# Patient Record
Sex: MALE | Race: OTHER | Employment: FULL TIME | URBAN - METROPOLITAN AREA
[De-identification: names, ages, dates, MRNs, and addresses within clinical notes are randomized per-mention and may not be internally consistent; named-entity substitution may affect disease eponyms.]

---

## 2017-11-01 ENCOUNTER — APPOINTMENT (OUTPATIENT)
Dept: CT IMAGING | Age: 52
End: 2017-11-01
Payer: COMMERCIAL

## 2017-11-01 ENCOUNTER — HOSPITAL ENCOUNTER (EMERGENCY)
Age: 52
Discharge: HOME | End: 2017-11-01
Payer: COMMERCIAL

## 2017-11-01 VITALS
OXYGEN SATURATION: 99 % | DIASTOLIC BLOOD PRESSURE: 83 MMHG | TEMPERATURE: 98.5 F | HEIGHT: 71 IN | WEIGHT: 205 LBS | HEART RATE: 58 BPM | SYSTOLIC BLOOD PRESSURE: 117 MMHG | BODY MASS INDEX: 28.7 KG/M2 | RESPIRATION RATE: 16 BRPM

## 2017-11-01 DIAGNOSIS — R10.31 RIGHT LOWER QUADRANT ABDOMINAL PAIN: Primary | ICD-10-CM

## 2017-11-01 LAB
ALBUMIN SERPL-MCNC: 4.3 G/DL (ref 3.9–4.9)
ALP BLD-CCNC: 77 U/L (ref 35–104)
ALT SERPL-CCNC: 16 U/L (ref 0–41)
ANION GAP SERPL CALCULATED.3IONS-SCNC: 11 MEQ/L (ref 7–13)
AST SERPL-CCNC: 17 U/L (ref 0–40)
BASOPHILS ABSOLUTE: 0 K/UL (ref 0–0.2)
BASOPHILS RELATIVE PERCENT: 0.4 %
BILIRUB SERPL-MCNC: 0.5 MG/DL (ref 0–1.2)
BILIRUBIN URINE: NEGATIVE
BLOOD, URINE: NEGATIVE
BUN BLDV-MCNC: 16 MG/DL (ref 6–20)
CALCIUM SERPL-MCNC: 8.8 MG/DL (ref 8.6–10.2)
CHLORIDE BLD-SCNC: 102 MEQ/L (ref 98–107)
CLARITY: CLEAR
CO2: 25 MEQ/L (ref 22–29)
COLOR: YELLOW
CREAT SERPL-MCNC: 0.71 MG/DL (ref 0.7–1.2)
EOSINOPHILS ABSOLUTE: 0.1 K/UL (ref 0–0.7)
EOSINOPHILS RELATIVE PERCENT: 2.4 %
GFR AFRICAN AMERICAN: >60
GFR NON-AFRICAN AMERICAN: >60
GLOBULIN: 2.3 G/DL (ref 2.3–3.5)
GLUCOSE BLD-MCNC: 95 MG/DL (ref 74–109)
GLUCOSE URINE: NEGATIVE MG/DL
HCT VFR BLD CALC: 40.5 % (ref 42–52)
HEMOGLOBIN: 13.6 G/DL (ref 14–18)
KETONES, URINE: NEGATIVE MG/DL
LEUKOCYTE ESTERASE, URINE: NEGATIVE
LIPASE: 35 U/L (ref 13–60)
LYMPHOCYTES ABSOLUTE: 1.9 K/UL (ref 1–4.8)
LYMPHOCYTES RELATIVE PERCENT: 33.3 %
MCH RBC QN AUTO: 31.3 PG (ref 27–31.3)
MCHC RBC AUTO-ENTMCNC: 33.5 % (ref 33–37)
MCV RBC AUTO: 93.4 FL (ref 80–100)
MONOCYTES ABSOLUTE: 0.4 K/UL (ref 0.2–0.8)
MONOCYTES RELATIVE PERCENT: 7.6 %
NEUTROPHILS ABSOLUTE: 3.3 K/UL (ref 1.4–6.5)
NEUTROPHILS RELATIVE PERCENT: 56.3 %
NITRITE, URINE: NEGATIVE
PDW BLD-RTO: 12.8 % (ref 11.5–14.5)
PH UA: 6 (ref 5–9)
PLATELET # BLD: 184 K/UL (ref 130–400)
POTASSIUM SERPL-SCNC: 4 MEQ/L (ref 3.5–5.1)
PROTEIN UA: NEGATIVE MG/DL
RBC # BLD: 4.34 M/UL (ref 4.7–6.1)
SODIUM BLD-SCNC: 138 MEQ/L (ref 132–144)
SPECIFIC GRAVITY UA: 1.02 (ref 1–1.03)
TOTAL PROTEIN: 6.6 G/DL (ref 6.4–8.1)
URINE REFLEX TO CULTURE: NORMAL
UROBILINOGEN, URINE: 0.2 E.U./DL
WBC # BLD: 5.8 K/UL (ref 4.8–10.8)

## 2017-11-01 PROCEDURE — 6360000004 HC RX CONTRAST MEDICATION: Performed by: PHYSICIAN ASSISTANT

## 2017-11-01 PROCEDURE — 99284 EMERGENCY DEPT VISIT MOD MDM: CPT

## 2017-11-01 PROCEDURE — 74177 CT ABD & PELVIS W/CONTRAST: CPT

## 2017-11-01 PROCEDURE — 81003 URINALYSIS AUTO W/O SCOPE: CPT

## 2017-11-01 PROCEDURE — 80053 COMPREHEN METABOLIC PANEL: CPT

## 2017-11-01 PROCEDURE — 83690 ASSAY OF LIPASE: CPT

## 2017-11-01 PROCEDURE — 36415 COLL VENOUS BLD VENIPUNCTURE: CPT

## 2017-11-01 PROCEDURE — 85025 COMPLETE CBC W/AUTO DIFF WBC: CPT

## 2017-11-01 PROCEDURE — 2500000003 HC RX 250 WO HCPCS

## 2017-11-01 RX ORDER — SODIUM CHLORIDE 0.9 % (FLUSH) 0.9 %
3 SYRINGE (ML) INJECTION EVERY 8 HOURS
Status: DISCONTINUED | OUTPATIENT
Start: 2017-11-01 | End: 2017-11-01 | Stop reason: HOSPADM

## 2017-11-01 RX ORDER — ESOMEPRAZOLE MAGNESIUM 40 MG/1
40 CAPSULE, DELAYED RELEASE ORAL
COMMUNITY
End: 2018-07-24

## 2017-11-01 RX ORDER — DICYCLOMINE HYDROCHLORIDE 10 MG/1
10 CAPSULE ORAL EVERY 6 HOURS PRN
Qty: 20 CAPSULE | Refills: 0 | Status: SHIPPED | OUTPATIENT
Start: 2017-11-01 | End: 2018-07-24

## 2017-11-01 RX ADMIN — BARIUM SULFATE 450 ML: 21 SUSPENSION ORAL at 09:05

## 2017-11-01 RX ADMIN — IOPAMIDOL 100 ML: 755 INJECTION, SOLUTION INTRAVENOUS at 10:25

## 2017-11-01 ASSESSMENT — ENCOUNTER SYMPTOMS
TROUBLE SWALLOWING: 0
NAUSEA: 1
BACK PAIN: 0
SORE THROAT: 0
PHOTOPHOBIA: 0
ABDOMINAL PAIN: 1
SHORTNESS OF BREATH: 0
DIARRHEA: 1
COUGH: 0

## 2017-11-01 ASSESSMENT — PAIN DESCRIPTION - PAIN TYPE: TYPE: ACUTE PAIN

## 2017-11-01 ASSESSMENT — PAIN DESCRIPTION - DESCRIPTORS: DESCRIPTORS: SHARP

## 2017-11-01 ASSESSMENT — PAIN DESCRIPTION - ORIENTATION: ORIENTATION: LOWER

## 2017-11-01 ASSESSMENT — PAIN SCALES - GENERAL: PAINLEVEL_OUTOF10: 6

## 2017-11-01 ASSESSMENT — PAIN DESCRIPTION - ONSET: ONSET: ON-GOING

## 2017-11-01 ASSESSMENT — PAIN DESCRIPTION - FREQUENCY: FREQUENCY: INTERMITTENT

## 2017-11-01 ASSESSMENT — PAIN DESCRIPTION - LOCATION: LOCATION: ABDOMEN

## 2017-11-01 ASSESSMENT — PAIN DESCRIPTION - PROGRESSION: CLINICAL_PROGRESSION: GRADUALLY WORSENING

## 2017-11-01 NOTE — ED PROVIDER NOTES
[address] [facility] ED  eMERGENCY dEPARTMENT eNCOUnter      Pt Name: Ada Lopez  MRN: [id number]  [place] 1965  Date of evaluation: 11/1/2017  Provider: Myles Fernandez PA-C      HISTORY OF PRESENT ILLNESS    HPI  Ada Lopez is a 47 y.o. male, who presents for evaluation of Lower abdominal pain that has been present since October 8. He states he followed with his primary care doctor, on and was told that it could be a \"stomach virus\". He did report nausea and vomiting at onset, but states he hasn't had any in about a week. He also reports ice cream consistency stools since onset of pain. Denies any fever, chills, recent antibiotic usage, recent travel, sick contacts. He has not had any imaging performed for this abdominal pain. He reports the past 2 nights that the pain is worsening in the right lower and left lower abdomen in that he feels a \"lump\" which is painful to touch. He has a remote history of cholecystectomy, otherwise no abdominal surgeries. REVIEW OF SYSTEMS       Review of Systems   Constitutional: Negative for chills and fever. HENT: Negative for ear pain, sore throat and trouble swallowing. Eyes: Negative for photophobia and visual disturbance. Respiratory: Negative for cough and shortness of breath. Cardiovascular: Negative for chest pain, palpitations and leg swelling. Gastrointestinal: Positive for abdominal pain, diarrhea and nausea. Genitourinary: Negative for difficulty urinating, dysuria, flank pain and hematuria. Musculoskeletal: Negative for back pain. Neurological: Negative for syncope, speech difficulty, numbness and headaches. Psychiatric/Behavioral: Negative for agitation, confusion and hallucinations.          PAST MEDICAL HISTORY     Past Medical History:   Diagnosis Date    GERD (gastroesophageal reflux disease)          SURGICAL HISTORY       Past Surgical History:   Procedure Laterality Date    ARM SURGERY Left     CHOLECYSTECTOMY      TONSILLECTOMY           CURRENT MEDICATIONS       Previous Medications    ESOMEPRAZOLE (NEXIUM) 40 MG DELAYED RELEASE CAPSULE    Take 40 mg by mouth every morning (before breakfast)       ALLERGIES     Review of patient's allergies indicates no known allergies. FAMILY HISTORY     History reviewed. No pertinent family history. SOCIAL HISTORY       Social History     Social History    Marital status:      Spouse name: N/A    Number of children: N/A    Years of education: N/A     Social History Main Topics    Smoking status: Never Smoker    Smokeless tobacco: Never Used    Alcohol use Yes      Comment: occasionally    Drug use: No    Sexual activity: Not Asked     Other Topics Concern    None     Social History Narrative    None         PHYSICAL EXAM       ED Triage Vitals [11/01/17 0821]   BP Temp Temp Source Pulse Resp SpO2 Height Weight   135/81 98.5 °F (36.9 °C) Oral 61 16 99 % 5' 11\" (1.803 m) 205 lb (93 kg)       Physical Exam   Constitutional: He is oriented to person, place, and time. No distress. HENT:   Head: Normocephalic and atraumatic. Eyes: EOM are normal. Pupils are equal, round, and reactive to light. Neck: Neck supple. Cardiovascular: Normal rate and regular rhythm. Pulmonary/Chest: Breath sounds normal. No respiratory distress. Abdominal: Soft. He exhibits distension. Bowel sounds are decreased. There is tenderness in the right lower quadrant, suprapubic area and left lower quadrant. There is no rebound and no CVA tenderness. Musculoskeletal: Normal range of motion. He exhibits no edema. Neurological: He is alert and oriented to person, place, and time. Skin: Skin is warm and dry. No rash noted. Nursing note and vitals reviewed.         LABS:  Labs Reviewed   CBC WITH AUTO DIFFERENTIAL - Abnormal; Notable for the following:        Result Value    RBC 4.34 (*)     Hemoglobin 13.6 (*)     Hematocrit 40.5 (*)     All other components within normal limits   COMPREHENSIVE METABOLIC PANEL   LIPASE   URINE RT REFLEX TO CULTURE         Procedures      MDM:   Vitals:    Vitals:    11/01/17 0821 11/01/17 1040   BP: 135/81 117/83   Pulse: 61 58   Resp: 16 16   Temp: 98.5 °F (36.9 °C)    TempSrc: Oral    SpO2: 99% 99%   Weight: 205 lb (93 kg)    Height: 5' 11\" (1.803 m)        CT scan with IV and oral contrast of the abdomen and pelvis is read by the radiologist to show no acute abnormalities. Laboratory evaluation is grossly unremarkable. I discussed the results of the patient. I explained to him that the cause of his abdominal pain remains uncertain. I offered a prescription for Bentyl and recommended follow-up to his primary care provider GI specialist.  Patient is agreeable and receptive to plan. Standard anticipatory guidance given to patient upon discharge. I have given them a specific time frame in which to follow-up and who to follow-up with. I have also advised them that they should return to the emergency department if they get worse, or not getting better or develop any new or concerning symptoms. Patient demonstrates understanding. FINAL IMPRESSION      1.  Right lower quadrant abdominal pain          DISPOSITION/PLAN   DISPOSITION Decision to Discharge    PATIENT REFERRED TO:  Wilfrido Saavedra DO  [address] [facility] [address] [phone]    In 2 days        DISCHARGE MEDICATIONS:  New Prescriptions    DICYCLOMINE (BENTYL) 10 MG CAPSULE    Take 1 capsule by mouth every 6 hours as needed (cramps)            Roberto Carlos Galindo PA-C (electronically signed)  Emergency Physician Assistant            Gala Newton PA-C  11/01/17 2480

## 2018-06-26 ENCOUNTER — HOSPITAL ENCOUNTER (OUTPATIENT)
Dept: GENERAL RADIOLOGY | Age: 53
Discharge: HOME | End: 2018-06-28
Payer: COMMERCIAL

## 2018-06-26 ENCOUNTER — HOSPITAL ENCOUNTER (OUTPATIENT)
Age: 53
Discharge: HOME | End: 2018-06-28
Payer: COMMERCIAL

## 2018-06-26 DIAGNOSIS — S93.401A SPRAIN OF LIGAMENT OF RIGHT ANKLE, INITIAL ENCOUNTER: ICD-10-CM

## 2018-06-26 PROCEDURE — 73630 X-RAY EXAM OF FOOT: CPT

## 2018-06-26 PROCEDURE — 73610 X-RAY EXAM OF ANKLE: CPT

## 2018-07-24 ENCOUNTER — OFFICE VISIT (OUTPATIENT)
Dept: UROLOGY | Age: 53
End: 2018-07-24
Payer: COMMERCIAL

## 2018-07-24 VITALS
HEIGHT: 71 IN | WEIGHT: 214 LBS | HEART RATE: 82 BPM | SYSTOLIC BLOOD PRESSURE: 100 MMHG | DIASTOLIC BLOOD PRESSURE: 72 MMHG | BODY MASS INDEX: 29.96 KG/M2

## 2018-07-24 DIAGNOSIS — N50.82 SCROTAL PAIN: Primary | ICD-10-CM

## 2018-07-24 PROCEDURE — G8419 CALC BMI OUT NRM PARAM NOF/U: HCPCS | Performed by: UROLOGY

## 2018-07-24 PROCEDURE — G8427 DOCREV CUR MEDS BY ELIG CLIN: HCPCS | Performed by: UROLOGY

## 2018-07-24 PROCEDURE — 99243 OFF/OP CNSLTJ NEW/EST LOW 30: CPT | Performed by: UROLOGY

## 2018-07-24 PROCEDURE — 3017F COLORECTAL CA SCREEN DOC REV: CPT | Performed by: UROLOGY

## 2018-07-24 NOTE — PROGRESS NOTES
 None     History reviewed. No pertinent family history. Current Outpatient Prescriptions   Medication Sig Dispense Refill    Famotidine (PEPCID PO) Take by mouth       No current facility-administered medications for this visit. Patient has no known allergies. All reviewed and verified by Dr Mary Arzate on today's visit    No results found for: PSA, PSADIA  No results found for this visit on 07/24/18. Physical Exam  Vitals:    07/24/18 1459   BP: 100/72   Pulse: 82   Weight: 214 lb (97.1 kg)   Height: 5' 11\" (1.803 m)     Constitutional: patient is oriented to person, place, and time. patient appears well-developed. currently in mild distress somewhat anxious. Ears: Adequate hearing/no hearing loss  Head: Normocephalic. Atraumatic  Neck: Normal range of motion. Cardiovascular: Normal rate, BP reviewed. normal rhythm  Pulmonary/Chest: Normal respiratory effort  no shortness of breath no wheezing  Abdominal: Not distended. Left inguinal area and no evidence of hernia, well-healed l right inguinal hernia incision hernia repair  Urologic Exam  Normal penile exam circumcised no lesions normal meatus. Right testicle not tender no evidence of epididymitis no evidence of epididymal cysts  Left testicle within normal limits   Normal rectal tone no rectal masses prostate small . Musculoskeletal: Normal range of motion. Normal strength. Extremities: No evidence of edema no venous stasis disease   Neurological: Cranial nerves intact, normal gait, normal sensation along scrotum   Skin: Skin is warm and dry. No lesions. No rashes or ulcers   Psychiatric:  Alert and oriented ×3 somewhat anxious  CT scan reviewed in detail patient has scoliosis no other findings urologically.   Assessment/Medical Necessity-Decision Making  Chronic right testicular pain following right hernia repair  Probable entrapment of right ilioinguinal nerve  No evidence of epididymitis or testicular lesions on the right  Plan  Scrotal

## 2018-07-26 ENCOUNTER — HOSPITAL ENCOUNTER (OUTPATIENT)
Dept: ULTRASOUND IMAGING | Age: 53
Discharge: HOME | End: 2018-07-28
Payer: COMMERCIAL

## 2018-07-26 DIAGNOSIS — N50.82 SCROTAL PAIN: ICD-10-CM

## 2018-07-26 PROCEDURE — 76870 US EXAM SCROTUM: CPT

## 2023-08-03 ENCOUNTER — APPOINTMENT (OUTPATIENT)
Dept: PRIMARY CARE | Facility: CLINIC | Age: 58
End: 2023-08-03
Payer: COMMERCIAL

## 2023-08-09 ENCOUNTER — OFFICE VISIT (OUTPATIENT)
Dept: PRIMARY CARE | Facility: CLINIC | Age: 58
End: 2023-08-09
Payer: COMMERCIAL

## 2023-08-09 VITALS
DIASTOLIC BLOOD PRESSURE: 75 MMHG | BODY MASS INDEX: 29.78 KG/M2 | TEMPERATURE: 98.2 F | HEART RATE: 65 BPM | SYSTOLIC BLOOD PRESSURE: 108 MMHG | WEIGHT: 208 LBS | OXYGEN SATURATION: 95 % | HEIGHT: 70 IN | RESPIRATION RATE: 18 BRPM

## 2023-08-09 DIAGNOSIS — R10.12 LEFT UPPER QUADRANT ABDOMINAL PAIN: Primary | ICD-10-CM

## 2023-08-09 PROBLEM — K57.90 DIVERTICULOSIS: Status: ACTIVE | Noted: 2023-08-09

## 2023-08-09 PROBLEM — L28.0 LICHEN SIMPLEX: Status: ACTIVE | Noted: 2023-08-09

## 2023-08-09 PROBLEM — M25.579 ANKLE PAIN: Status: ACTIVE | Noted: 2023-08-09

## 2023-08-09 PROBLEM — Z20.822 CLOSE EXPOSURE TO COVID-19 VIRUS: Status: ACTIVE | Noted: 2023-08-09

## 2023-08-09 PROBLEM — R07.9 CHEST PAIN: Status: ACTIVE | Noted: 2023-08-09

## 2023-08-09 PROBLEM — R10.30 GROIN PAIN: Status: ACTIVE | Noted: 2023-08-09

## 2023-08-09 PROBLEM — N53.19 EJACULATORY DISORDER: Status: ACTIVE | Noted: 2023-08-09

## 2023-08-09 PROBLEM — L30.0 NUMMULAR ECZEMA: Status: ACTIVE | Noted: 2023-08-09

## 2023-08-09 PROBLEM — M79.673 FOOT PAIN: Status: ACTIVE | Noted: 2023-08-09

## 2023-08-09 PROBLEM — S76.219A INGUINAL STRAIN: Status: ACTIVE | Noted: 2023-08-09

## 2023-08-09 PROBLEM — M79.606: Status: ACTIVE | Noted: 2023-08-09

## 2023-08-09 PROBLEM — R10.9 ABDOMINAL PAIN: Status: ACTIVE | Noted: 2023-08-09

## 2023-08-09 PROBLEM — S39.012A STRAIN OF LUMBAR SPINE: Status: ACTIVE | Noted: 2023-08-09

## 2023-08-09 PROBLEM — J02.9 SORE THROAT: Status: ACTIVE | Noted: 2023-08-09

## 2023-08-09 PROBLEM — J34.0 NASAL ULCER: Status: ACTIVE | Noted: 2023-08-09

## 2023-08-09 PROBLEM — K43.2 HERNIA, INCISIONAL: Status: ACTIVE | Noted: 2023-08-09

## 2023-08-09 PROBLEM — R35.1 NOCTURIA: Status: ACTIVE | Noted: 2023-08-09

## 2023-08-09 PROBLEM — K21.9 GERD (GASTROESOPHAGEAL REFLUX DISEASE): Status: ACTIVE | Noted: 2023-08-09

## 2023-08-09 PROBLEM — G43.909 MIGRAINE HEADACHE: Status: ACTIVE | Noted: 2023-08-09

## 2023-08-09 PROBLEM — J30.2 SEASONAL ALLERGIES: Status: ACTIVE | Noted: 2023-08-09

## 2023-08-09 PROBLEM — K57.30 DIVERTICULOSIS OF COLON: Status: ACTIVE | Noted: 2023-08-09

## 2023-08-09 PROCEDURE — 99214 OFFICE O/P EST MOD 30 MIN: CPT | Performed by: FAMILY MEDICINE

## 2023-08-09 PROCEDURE — 1036F TOBACCO NON-USER: CPT | Performed by: FAMILY MEDICINE

## 2023-08-09 RX ORDER — POLYETHYLENE GLYCOL 3350 17 G/17G
17 POWDER, FOR SOLUTION ORAL DAILY
Qty: 595 G | Refills: 1 | Status: SHIPPED | OUTPATIENT
Start: 2023-08-09 | End: 2023-12-08 | Stop reason: ALTCHOICE

## 2023-08-09 RX ORDER — AMOXICILLIN AND CLAVULANATE POTASSIUM 875; 125 MG/1; MG/1
1 TABLET, FILM COATED ORAL 2 TIMES DAILY
Qty: 14 TABLET | Refills: 0 | Status: SHIPPED | OUTPATIENT
Start: 2023-08-09 | End: 2023-08-09 | Stop reason: SDUPTHER

## 2023-08-09 RX ORDER — AMOXICILLIN AND CLAVULANATE POTASSIUM 875; 125 MG/1; MG/1
1 TABLET, FILM COATED ORAL 2 TIMES DAILY
Qty: 20 TABLET | Refills: 1 | Status: SHIPPED | OUTPATIENT
Start: 2023-08-09 | End: 2023-08-19

## 2023-08-09 RX ORDER — OMEPRAZOLE 20 MG/1
1 CAPSULE, DELAYED RELEASE ORAL DAILY
COMMUNITY
Start: 2020-07-30

## 2023-08-09 ASSESSMENT — ENCOUNTER SYMPTOMS
NAUSEA: 1
DIARRHEA: 1
ABDOMINAL PAIN: 1

## 2023-08-09 NOTE — ASSESSMENT & PLAN NOTE
Discussed with patient either of 3 possible diagnoses.  Will check some labs to determine and support colitis.  Prior history of diverticulosis/panniculitis.  Review..  With patient monitoring/clear liquids soft diet/Metamucil with 64 ounces of water daily/consider MiraLAX/no change to general surgery/after 4 days and Augmentin/recheck office 2 weeks

## 2023-08-09 NOTE — PATIENT INSTRUCTIONS
Please consider exercise program involving walking or some other form of aerobic activity 5 days weekly for 30 minutes... Let's also consider strengthening of large muscle groups like the abdominal muscles or shoulder muscles... Twice weekly with reps of 5/10/15 exercises and gradually increase strength.. This is not heavy strength training but light weight training... Sit ups or back exercise routine.. Please ask for handout if uncertain how to do..This  will help to strengthen your muscles which in turn will help you to lose weight.... You might ask what is the best diet available.. I would strongly encourage you to consider  Weight Watchers.. And as  your  fellow on  Weight Watchers physician attempting to  live this  LIFE  style  choice with you....  I will be glad to give you recommendations on what to eat.. Consider buying Brigette bread.., susana bagle thin bread.. oikos yogurt... eggs  to eat as hard-boiled... Halo top ice cream for snack... All these are delicious foods which.. when eaten and  being compliant eating three  meals daily  breakfast lunch and dinner, drinking  64 ounces of water daily we will all win together !!!!!!!. This will be a means for you to lose weight... Consider also the smart phone home ... My plate.. Or My  fitness  pal..,  as additional possibilities for weight loss... Good  lucsoco Real!    Discussed medication side effects.  The  risk benefits and treatment options  discussed with patient.       Please schedule follow-up appointment based upon your improvement/failure to improve/chronic medical conditions and physician recommendations during office appointment at the .       Patient advised to go to er if symptoms worsen or to call answering service, or to return to office for additional evaluation    This note was partially  generated using Dragon voice recognition and there may be incorrect words, wording, spelling, or pronunciation errors that were not  corrected prior to committing the note to the medical record.   Discussed with patient either of 3 possible diagnoses.  Will check some labs to determine and support colitis.  Prior history of diverticulosis/panniculitis.  Review..  With patient monitoring/clear liquids soft diet/Metamucil with 64 ounces of water daily/consider MiraLAX/no change to general surgery/after 4 days and Augmentin/recheck office 2 weeks

## 2023-08-09 NOTE — PROGRESS NOTES
"Subjective   Patient ID: Victor Manuel Sales is a 57 y.o. male who presents for stomach issues  (Sharp tender pain unable to leave urine).    Abdominal Pain  This is a new problem. The current episode started in the past 7 days. The onset quality is sudden. The problem occurs constantly. The problem has been gradually improving. The pain is located in the LUQ. The pain is at a severity of 5/10. The pain is moderate. The quality of the pain is sharp. The abdominal pain does not radiate. Associated symptoms include diarrhea and nausea. The pain is aggravated by palpation. The pain is relieved by Nothing. He has tried acetaminophen and antacids for the symptoms. The treatment provided mild relief. His past medical history is significant for gallstones and GERD.        Review of Systems   Gastrointestinal:  Positive for abdominal pain, diarrhea and nausea.   cardiovascular:  no  palpitations or chest  pain  respiratory: no  shortness  of  breath  endocrine:  no polydipsia,  no polyuria  musculoskeletal:  no  myalgia.. no arthralgia  All other  systems discussed  negative     Objective   /75   Pulse 65   Temp 36.8 °C (98.2 °F)   Resp 18   Ht 1.778 m (5' 10\")   Wt 94.3 kg (208 lb)   SpO2 95%   BMI 29.84 kg/m²     Physical Exam  Vitals: I have reviewed the vitals  General: Well-developed.  In no acute distress.  Eyes:   sclera nonicteric.  Conjunctiva not injected.  No discharge.   HEAD: Normocephalic, atraumatic.  HEENT   Mucous membranes moist.  Posterior oropharynx nonerythematous, no tonsillar exudates.      No cervical lymphadenopathy.  Cardio: Regular rate and rhythm.  No murmur, rub or gallop.  Pulmonary: Lungs clear to auscultation in all fields.  No accessory muscle use.  Abdomen :     with    left upper guadrant and left  lower  quadrant tenderness  GI/: Normal active bowel sounds.  Soft, nontender.  No masses or organomegaly appreciated.  Musculoskeletal: No gross deformities appreciated.  Neuro: " Alert, age-appropriate.  Normal muscle tone.  Moving all extremities.  Skin: No rash, bruises or lesions.   CT of abdomen and pelvis dated 2/6/2023 showed liver with subcentimeter lesion right hepatic lobe too small to characterize most likely a cyst gallbladder absent spleen unremarkable adrenal glands both kidneys no calculi identified within the kidneys there is no evidence of hydronephrosis urinary bladder appears grossly colonic diverticulosis with no evidence of diverticulitis small bowel loops are normal in thickness and caliber no evidence of bowel obstruction appendix normal caliber no aneurysm of the abdominal aorta enlarged retroperitoneal lymph nodes mild haziness and stranding within the small bowel mesentery with increased number of small mesenteric nodes which have not by CT criteria this is nonspecific associated with mesenteric panniculitis    Results of lab testing dated 2/6/2023 showed sed rate of CBC with differential with normal white count with hemoglobin 14.9 hematocrit 45.0 platelets 248 CMP with liver test within normal limits GFR greater than 90 text  Assessment/Plan   Problem List Items Addressed This Visit       Abdominal pain - Primary     Discussed with patient either of 3 possible diagnoses.  Will check some labs to determine and support colitis.  Prior history of diverticulosis/panniculitis.  Review..  With patient monitoring/clear liquids soft diet/Metamucil with 64 ounces of water daily/consider MiraLAX/no change to general surgery/after 4 days and Augmentin/recheck office 2 weeks         Relevant Medications    polyethylene glycol (Glycolax, Miralax) 17 gram/dose powder    amoxicillin-pot clavulanate (Augmentin) 875-125 mg tablet    Other Relevant Orders    CBC and Auto Differential    Sedimentation Rate    Comprehensive Metabolic Panel    XR abdomen 1 view    Urinalysis Microscopic Only    Lipid Panel    Magnesium    Amylase    Lipase    Referral to General Surgery

## 2023-12-04 ENCOUNTER — HOSPITAL ENCOUNTER (OUTPATIENT)
Facility: HOSPITAL | Age: 58
Setting detail: OBSERVATION
Discharge: HOME | End: 2023-12-05
Attending: STUDENT IN AN ORGANIZED HEALTH CARE EDUCATION/TRAINING PROGRAM | Admitting: INTERNAL MEDICINE
Payer: COMMERCIAL

## 2023-12-04 ENCOUNTER — APPOINTMENT (OUTPATIENT)
Dept: RADIOLOGY | Facility: HOSPITAL | Age: 58
End: 2023-12-04
Payer: COMMERCIAL

## 2023-12-04 ENCOUNTER — APPOINTMENT (OUTPATIENT)
Dept: CARDIOLOGY | Facility: HOSPITAL | Age: 58
End: 2023-12-04
Payer: COMMERCIAL

## 2023-12-04 DIAGNOSIS — R42 DIZZINESS: Primary | ICD-10-CM

## 2023-12-04 LAB
ALBUMIN SERPL BCP-MCNC: 4.6 G/DL (ref 3.4–5)
ALP SERPL-CCNC: 84 U/L (ref 33–120)
ALT SERPL W P-5'-P-CCNC: 22 U/L (ref 10–52)
ANION GAP SERPL CALC-SCNC: 11 MMOL/L (ref 10–20)
AST SERPL W P-5'-P-CCNC: 19 U/L (ref 9–39)
BASOPHILS # BLD AUTO: 0.03 X10*3/UL (ref 0–0.1)
BASOPHILS NFR BLD AUTO: 0.5 %
BILIRUB SERPL-MCNC: 1 MG/DL (ref 0–1.2)
BUN SERPL-MCNC: 12 MG/DL (ref 6–23)
CALCIUM SERPL-MCNC: 9.4 MG/DL (ref 8.6–10.3)
CARDIAC TROPONIN I PNL SERPL HS: 3 NG/L (ref 0–20)
CARDIAC TROPONIN I PNL SERPL HS: 3 NG/L (ref 0–20)
CHLORIDE SERPL-SCNC: 106 MMOL/L (ref 98–107)
CO2 SERPL-SCNC: 25 MMOL/L (ref 21–32)
CREAT SERPL-MCNC: 0.89 MG/DL (ref 0.5–1.3)
EOSINOPHIL # BLD AUTO: 0.12 X10*3/UL (ref 0–0.7)
EOSINOPHIL NFR BLD AUTO: 2.1 %
ERYTHROCYTE [DISTWIDTH] IN BLOOD BY AUTOMATED COUNT: 12.2 % (ref 11.5–14.5)
FLUAV RNA RESP QL NAA+PROBE: NOT DETECTED
FLUBV RNA RESP QL NAA+PROBE: NOT DETECTED
GFR SERPL CREATININE-BSD FRML MDRD: >90 ML/MIN/1.73M*2
GLUCOSE SERPL-MCNC: 90 MG/DL (ref 74–99)
HCT VFR BLD AUTO: 46.3 % (ref 41–52)
HGB BLD-MCNC: 15.7 G/DL (ref 13.5–17.5)
IMM GRANULOCYTES # BLD AUTO: 0.02 X10*3/UL (ref 0–0.7)
IMM GRANULOCYTES NFR BLD AUTO: 0.3 % (ref 0–0.9)
LYMPHOCYTES # BLD AUTO: 1.62 X10*3/UL (ref 1.2–4.8)
LYMPHOCYTES NFR BLD AUTO: 28.2 %
MCH RBC QN AUTO: 31.5 PG (ref 26–34)
MCHC RBC AUTO-ENTMCNC: 33.9 G/DL (ref 32–36)
MCV RBC AUTO: 93 FL (ref 80–100)
MONOCYTES # BLD AUTO: 0.51 X10*3/UL (ref 0.1–1)
MONOCYTES NFR BLD AUTO: 8.9 %
NEUTROPHILS # BLD AUTO: 3.45 X10*3/UL (ref 1.2–7.7)
NEUTROPHILS NFR BLD AUTO: 60 %
NRBC BLD-RTO: 0 /100 WBCS (ref 0–0)
PLATELET # BLD AUTO: 210 X10*3/UL (ref 150–450)
POTASSIUM SERPL-SCNC: 3.8 MMOL/L (ref 3.5–5.3)
PROT SERPL-MCNC: 7.6 G/DL (ref 6.4–8.2)
RBC # BLD AUTO: 4.98 X10*6/UL (ref 4.5–5.9)
SARS-COV-2 RNA RESP QL NAA+PROBE: NOT DETECTED
SODIUM SERPL-SCNC: 138 MMOL/L (ref 136–145)
WBC # BLD AUTO: 5.8 X10*3/UL (ref 4.4–11.3)

## 2023-12-04 PROCEDURE — 2550000001 HC RX 255 CONTRASTS: Performed by: STUDENT IN AN ORGANIZED HEALTH CARE EDUCATION/TRAINING PROGRAM

## 2023-12-04 PROCEDURE — 70450 CT HEAD/BRAIN W/O DYE: CPT

## 2023-12-04 PROCEDURE — 87636 SARSCOV2 & INF A&B AMP PRB: CPT | Performed by: STUDENT IN AN ORGANIZED HEALTH CARE EDUCATION/TRAINING PROGRAM

## 2023-12-04 PROCEDURE — G0378 HOSPITAL OBSERVATION PER HR: HCPCS

## 2023-12-04 PROCEDURE — 36415 COLL VENOUS BLD VENIPUNCTURE: CPT | Performed by: STUDENT IN AN ORGANIZED HEALTH CARE EDUCATION/TRAINING PROGRAM

## 2023-12-04 PROCEDURE — 71045 X-RAY EXAM CHEST 1 VIEW: CPT

## 2023-12-04 PROCEDURE — 99285 EMERGENCY DEPT VISIT HI MDM: CPT | Performed by: STUDENT IN AN ORGANIZED HEALTH CARE EDUCATION/TRAINING PROGRAM

## 2023-12-04 PROCEDURE — 96374 THER/PROPH/DIAG INJ IV PUSH: CPT

## 2023-12-04 PROCEDURE — 85025 COMPLETE CBC W/AUTO DIFF WBC: CPT | Performed by: STUDENT IN AN ORGANIZED HEALTH CARE EDUCATION/TRAINING PROGRAM

## 2023-12-04 PROCEDURE — 70496 CT ANGIOGRAPHY HEAD: CPT

## 2023-12-04 PROCEDURE — 84484 ASSAY OF TROPONIN QUANT: CPT | Performed by: STUDENT IN AN ORGANIZED HEALTH CARE EDUCATION/TRAINING PROGRAM

## 2023-12-04 PROCEDURE — 99223 1ST HOSP IP/OBS HIGH 75: CPT | Performed by: INTERNAL MEDICINE

## 2023-12-04 PROCEDURE — 70551 MRI BRAIN STEM W/O DYE: CPT

## 2023-12-04 PROCEDURE — 83036 HEMOGLOBIN GLYCOSYLATED A1C: CPT | Mod: ELYLAB | Performed by: INTERNAL MEDICINE

## 2023-12-04 PROCEDURE — 93005 ELECTROCARDIOGRAM TRACING: CPT

## 2023-12-04 PROCEDURE — 71045 X-RAY EXAM CHEST 1 VIEW: CPT | Performed by: RADIOLOGY

## 2023-12-04 PROCEDURE — 2500000001 HC RX 250 WO HCPCS SELF ADMINISTERED DRUGS (ALT 637 FOR MEDICARE OP): Performed by: INTERNAL MEDICINE

## 2023-12-04 PROCEDURE — 80053 COMPREHEN METABOLIC PANEL: CPT | Performed by: STUDENT IN AN ORGANIZED HEALTH CARE EDUCATION/TRAINING PROGRAM

## 2023-12-04 PROCEDURE — 2500000004 HC RX 250 GENERAL PHARMACY W/ HCPCS (ALT 636 FOR OP/ED): Performed by: INTERNAL MEDICINE

## 2023-12-04 PROCEDURE — 70551 MRI BRAIN STEM W/O DYE: CPT | Performed by: RADIOLOGY

## 2023-12-04 PROCEDURE — 2500000001 HC RX 250 WO HCPCS SELF ADMINISTERED DRUGS (ALT 637 FOR MEDICARE OP): Performed by: STUDENT IN AN ORGANIZED HEALTH CARE EDUCATION/TRAINING PROGRAM

## 2023-12-04 PROCEDURE — 2500000004 HC RX 250 GENERAL PHARMACY W/ HCPCS (ALT 636 FOR OP/ED): Performed by: STUDENT IN AN ORGANIZED HEALTH CARE EDUCATION/TRAINING PROGRAM

## 2023-12-04 PROCEDURE — 96375 TX/PRO/DX INJ NEW DRUG ADDON: CPT | Mod: 59

## 2023-12-04 PROCEDURE — 70496 CT ANGIOGRAPHY HEAD: CPT | Performed by: RADIOLOGY

## 2023-12-04 RX ORDER — ACETAMINOPHEN 160 MG/5ML
650 SOLUTION ORAL EVERY 4 HOURS PRN
Status: DISCONTINUED | OUTPATIENT
Start: 2023-12-04 | End: 2023-12-05 | Stop reason: HOSPADM

## 2023-12-04 RX ORDER — ACETAMINOPHEN 325 MG/1
975 TABLET ORAL ONCE
Status: COMPLETED | OUTPATIENT
Start: 2023-12-04 | End: 2023-12-04

## 2023-12-04 RX ORDER — MECLIZINE HCL 12.5 MG 12.5 MG/1
25 TABLET ORAL 3 TIMES DAILY PRN
Status: DISCONTINUED | OUTPATIENT
Start: 2023-12-04 | End: 2023-12-05 | Stop reason: HOSPADM

## 2023-12-04 RX ORDER — MECLIZINE HCL 12.5 MG 12.5 MG/1
25 TABLET ORAL ONCE
Status: COMPLETED | OUTPATIENT
Start: 2023-12-04 | End: 2023-12-04

## 2023-12-04 RX ORDER — SODIUM CHLORIDE, SODIUM LACTATE, POTASSIUM CHLORIDE, CALCIUM CHLORIDE 600; 310; 30; 20 MG/100ML; MG/100ML; MG/100ML; MG/100ML
75 INJECTION, SOLUTION INTRAVENOUS CONTINUOUS
Status: DISCONTINUED | OUTPATIENT
Start: 2023-12-04 | End: 2023-12-05 | Stop reason: HOSPADM

## 2023-12-04 RX ORDER — ACETAMINOPHEN 650 MG/1
650 SUPPOSITORY RECTAL EVERY 4 HOURS PRN
Status: DISCONTINUED | OUTPATIENT
Start: 2023-12-04 | End: 2023-12-05 | Stop reason: HOSPADM

## 2023-12-04 RX ORDER — METOCLOPRAMIDE HYDROCHLORIDE 5 MG/ML
10 INJECTION INTRAMUSCULAR; INTRAVENOUS ONCE
Status: DISCONTINUED | OUTPATIENT
Start: 2023-12-04 | End: 2023-12-05 | Stop reason: HOSPADM

## 2023-12-04 RX ORDER — SUMATRIPTAN SUCCINATE 25 MG/1
50 TABLET ORAL ONCE
Status: COMPLETED | OUTPATIENT
Start: 2023-12-04 | End: 2023-12-04

## 2023-12-04 RX ORDER — KETOROLAC TROMETHAMINE 30 MG/ML
15 INJECTION, SOLUTION INTRAMUSCULAR; INTRAVENOUS ONCE
Status: COMPLETED | OUTPATIENT
Start: 2023-12-04 | End: 2023-12-04

## 2023-12-04 RX ORDER — ACETAMINOPHEN 325 MG/1
650 TABLET ORAL EVERY 4 HOURS PRN
Status: DISCONTINUED | OUTPATIENT
Start: 2023-12-04 | End: 2023-12-05 | Stop reason: HOSPADM

## 2023-12-04 RX ORDER — TETRACAINE HYDROCHLORIDE 5 MG/ML
1 SOLUTION OPHTHALMIC ONCE
Status: COMPLETED | OUTPATIENT
Start: 2023-12-04 | End: 2023-12-04

## 2023-12-04 RX ORDER — PANTOPRAZOLE SODIUM 40 MG/1
40 TABLET, DELAYED RELEASE ORAL
Status: DISCONTINUED | OUTPATIENT
Start: 2023-12-05 | End: 2023-12-05 | Stop reason: HOSPADM

## 2023-12-04 RX ORDER — DIPHENHYDRAMINE HYDROCHLORIDE 50 MG/ML
25 INJECTION INTRAMUSCULAR; INTRAVENOUS ONCE
Status: COMPLETED | OUTPATIENT
Start: 2023-12-04 | End: 2023-12-04

## 2023-12-04 RX ADMIN — TETRACAINE HYDROCHLORIDE 1 DROP: 5 SOLUTION OPHTHALMIC at 11:44

## 2023-12-04 RX ADMIN — SUMATRIPTAN SUCCINATE 50 MG: 25 TABLET ORAL at 19:56

## 2023-12-04 RX ADMIN — DIPHENHYDRAMINE HYDROCHLORIDE 25 MG: 50 INJECTION, SOLUTION INTRAMUSCULAR; INTRAVENOUS at 11:30

## 2023-12-04 RX ADMIN — ACETAMINOPHEN 975 MG: 325 TABLET ORAL at 11:30

## 2023-12-04 RX ADMIN — KETOROLAC TROMETHAMINE 15 MG: 30 INJECTION, SOLUTION INTRAMUSCULAR at 15:00

## 2023-12-04 RX ADMIN — IOHEXOL 75 ML: 350 INJECTION, SOLUTION INTRAVENOUS at 13:15

## 2023-12-04 RX ADMIN — FLUORESCEIN SODIUM 1 STRIP: 1 STRIP OPHTHALMIC at 11:44

## 2023-12-04 RX ADMIN — SODIUM CHLORIDE, POTASSIUM CHLORIDE, SODIUM LACTATE AND CALCIUM CHLORIDE 75 ML/HR: 600; 310; 30; 20 INJECTION, SOLUTION INTRAVENOUS at 18:17

## 2023-12-04 RX ADMIN — MECLIZINE HCL 12.5 MG 25 MG: 12.5 TABLET ORAL at 11:30

## 2023-12-04 SDOH — SOCIAL STABILITY: SOCIAL INSECURITY: DOES ANYONE TRY TO KEEP YOU FROM HAVING/CONTACTING OTHER FRIENDS OR DOING THINGS OUTSIDE YOUR HOME?: NO

## 2023-12-04 SDOH — SOCIAL STABILITY: SOCIAL INSECURITY: ABUSE: ADULT

## 2023-12-04 SDOH — SOCIAL STABILITY: SOCIAL INSECURITY: DO YOU FEEL UNSAFE GOING BACK TO THE PLACE WHERE YOU ARE LIVING?: NO

## 2023-12-04 SDOH — SOCIAL STABILITY: SOCIAL INSECURITY: DO YOU FEEL ANYONE HAS EXPLOITED OR TAKEN ADVANTAGE OF YOU FINANCIALLY OR OF YOUR PERSONAL PROPERTY?: NO

## 2023-12-04 SDOH — SOCIAL STABILITY: SOCIAL INSECURITY: HAS ANYONE EVER THREATENED TO HURT YOUR FAMILY OR YOUR PETS?: NO

## 2023-12-04 SDOH — SOCIAL STABILITY: SOCIAL INSECURITY: ARE THERE ANY APPARENT SIGNS OF INJURIES/BEHAVIORS THAT COULD BE RELATED TO ABUSE/NEGLECT?: NO

## 2023-12-04 SDOH — SOCIAL STABILITY: SOCIAL INSECURITY: ARE YOU OR HAVE YOU BEEN THREATENED OR ABUSED PHYSICALLY, EMOTIONALLY, OR SEXUALLY BY ANYONE?: NO

## 2023-12-04 SDOH — SOCIAL STABILITY: SOCIAL INSECURITY: HAVE YOU HAD THOUGHTS OF HARMING ANYONE ELSE?: NO

## 2023-12-04 ASSESSMENT — COGNITIVE AND FUNCTIONAL STATUS - GENERAL
DAILY ACTIVITIY SCORE: 24
MOVING TO AND FROM BED TO CHAIR: A LITTLE
PATIENT BASELINE BEDBOUND: NO
CLIMB 3 TO 5 STEPS WITH RAILING: A LITTLE
MOBILITY SCORE: 20
WALKING IN HOSPITAL ROOM: A LITTLE
STANDING UP FROM CHAIR USING ARMS: A LITTLE

## 2023-12-04 ASSESSMENT — ACTIVITIES OF DAILY LIVING (ADL)
FEEDING YOURSELF: INDEPENDENT
BATHING: INDEPENDENT
GROOMING: INDEPENDENT
HEARING - LEFT EAR: FUNCTIONAL
ADEQUATE_TO_COMPLETE_ADL: YES
LACK_OF_TRANSPORTATION: NO
DRESSING YOURSELF: INDEPENDENT
HEARING - RIGHT EAR: FUNCTIONAL
WALKS IN HOME: INDEPENDENT
TOILETING: INDEPENDENT
PATIENT'S MEMORY ADEQUATE TO SAFELY COMPLETE DAILY ACTIVITIES?: YES
JUDGMENT_ADEQUATE_SAFELY_COMPLETE_DAILY_ACTIVITIES: YES

## 2023-12-04 ASSESSMENT — LIFESTYLE VARIABLES
EVER FELT BAD OR GUILTY ABOUT YOUR DRINKING: NO
HOW OFTEN DO YOU HAVE A DRINK CONTAINING ALCOHOL: NEVER
HOW MANY STANDARD DRINKS CONTAINING ALCOHOL DO YOU HAVE ON A TYPICAL DAY: PATIENT DOES NOT DRINK
HAVE YOU EVER FELT YOU SHOULD CUT DOWN ON YOUR DRINKING: NO
EVER HAD A DRINK FIRST THING IN THE MORNING TO STEADY YOUR NERVES TO GET RID OF A HANGOVER: NO
SUBSTANCE_ABUSE_PAST_12_MONTHS: YES
HOW OFTEN DO YOU HAVE 6 OR MORE DRINKS ON ONE OCCASION: NEVER
AUDIT-C TOTAL SCORE: 0
SKIP TO QUESTIONS 9-10: 1
PRESCIPTION_ABUSE_PAST_12_MONTHS: NO
HAVE PEOPLE ANNOYED YOU BY CRITICIZING YOUR DRINKING: NO
REASON UNABLE TO ASSESS: NO
AUDIT-C TOTAL SCORE: 0

## 2023-12-04 ASSESSMENT — COLUMBIA-SUICIDE SEVERITY RATING SCALE - C-SSRS
2. HAVE YOU ACTUALLY HAD ANY THOUGHTS OF KILLING YOURSELF?: NO
1. IN THE PAST MONTH, HAVE YOU WISHED YOU WERE DEAD OR WISHED YOU COULD GO TO SLEEP AND NOT WAKE UP?: NO
6. HAVE YOU EVER DONE ANYTHING, STARTED TO DO ANYTHING, OR PREPARED TO DO ANYTHING TO END YOUR LIFE?: NO

## 2023-12-04 ASSESSMENT — PAIN SCALES - GENERAL
PAINLEVEL_OUTOF10: 8
PAINLEVEL_OUTOF10: 3

## 2023-12-04 ASSESSMENT — PATIENT HEALTH QUESTIONNAIRE - PHQ9
SUM OF ALL RESPONSES TO PHQ9 QUESTIONS 1 & 2: 0
1. LITTLE INTEREST OR PLEASURE IN DOING THINGS: NOT AT ALL
2. FEELING DOWN, DEPRESSED OR HOPELESS: NOT AT ALL

## 2023-12-04 ASSESSMENT — PAIN - FUNCTIONAL ASSESSMENT: PAIN_FUNCTIONAL_ASSESSMENT: 0-10

## 2023-12-04 NOTE — CARE PLAN
The patient's goals for the shift include  refrain from falls    The clinical goals for the shift include  refrain from falls

## 2023-12-04 NOTE — H&P
" Medical Group History and Physical    ASSESSMENT & PLAN:     Intractable dizziness  Hx of migraines  - CT head without contrast neg for acute findings  - CTA head neg for acute findings  - labs unremarkable  - HINTS exam not suggestive of central etiology  - he does not take any ppx medications for his migraines, usually just takes NSAID for abortive relief when he gets them  Plan:  - maybe atypical migraine manifestation (although says feels different than his usual migraines), did not get much relief with migraine cocktail in ED, will trial sumatriptan  - with persistent, unrelenting vertigo, suppose will order MRI brain to r/o posterior circulation stroke as etiology  - check orthostatics once  - gentle IVF for now  - PRN meclizine  - tele monitoring overnight  - consider neuro consult if abnormal findings on MRI brain or difficult to control vertigo    Elevated BP  - some elevated Bps in ED  - does not have known history  - if still elevated in AM, may consider starting treatment, also a possibility this is responsible for symptoms but seems less likely    GERD  - PPI    Vte ppx scds    Gregory Gregorio MD    HISTORY OF PRESENT ILLNESS:   Chief Complaint: dizziness    History Of Present Illness:    58M with PMH of migraines, GERD who is presenting with dizziness and HA. His history is nebulous and difficult to understand. He says his symptoms started as lightheadedness, \"swimmer head\", and \"R side of his head was floating\" on Thursday night. He denies any HA at this time. Unable to say if room was spinning or not. His symptoms persistent into Friday, but were slghtly better. They continued throughout the weekend, and he was generally having malaise. On Sunday, his dizziness changed to room spinning, and was having this persistent \"R side of head floating\" sensation. On Monday he developed acute L sided periorbital HA, that has now resolved. He was having intermittent nausea since symptom onset, no vomiting. " Currently no nausea. He denies any photophobia, phonophobia, vision changes, dyspnea, CP, palpitations, LE swelling. He had a mild cold about 2 weeks ago, as did his wife, but that has since resolved. Before Thursday he was in his usual state of health. He has been able to ambulate okay throughout all of this. Does not have much medical history, only Rx medication is PPI which he uses PRN. He says his migraines do not feel like this, feels distinctly different.     In the ED, afebrile, HDS, on RA. Labs unremarkable. COVID/flu neg. CT head without IV contrast and CTA head neg for acute findings. Received Tylenol, Toradol, Benadryl, meclizine, Reglan in ED. He says his symptoms have not changed since getting this.     Review of systems: 10 point review of systems is otherwise negative except as mentioned above.    PAST HISTORIES:     Past Medical History:  He has a past medical history of Other specified health status.    Past Surgical History:  He has a past surgical history that includes Other surgical history (10/14/2019) and CT angio head w and wo IV contrast (12/4/2023).      Social History:  He reports that he has quit smoking. His smoking use included cigars. He has quit using smokeless tobacco. He reports that he does not currently use alcohol. He reports that he does not currently use drugs after having used the following drugs: Marijuana.    Family History:  No family history on file.     Allergies:  Patient has no known allergies.    OBJECTIVE:     Last Recorded Vitals:  Vitals:    12/04/23 1140 12/04/23 1141 12/04/23 1142 12/04/23 1331   BP: (!) 139/98 (!) 137/97 (!) 127/92 114/75   Patient Position: Sitting Lying Standing    Pulse: 62 59 61 70   Resp:    16   Temp:       SpO2:    97%   Weight:       Height:           Last I/O:  No intake/output data recorded.    Physical Exam:  GEN: healthy appearing, appears stated age, NAD  HEENT: NCAT, PERRLA, EOMI  NECK: supple, no masses  CV: RRR, no m/r/g, no LE  edema  LUNGS: CTAB, no w/r/c  ABD: soft, NT, ND, NBS  SKIN: no rashes  MSK; no gross deformities, normal joints  NEURO: A+Ox3, no FND  PSYCH: appropriate mood, affect    Scheduled Medications  metoclopramide, 10 mg, intravenous, Once        PRN Medications      Continuous Medications       Outpatient Medications:  Prior to Admission medications    Medication Sig Start Date End Date Taking? Authorizing Provider   omeprazole (PriLOSEC) 20 mg DR capsule Take 1 capsule (20 mg) by mouth once daily. 7/30/20   Historical Provider, MD   polyethylene glycol (Glycolax, Miralax) 17 gram/dose powder Take 17 g by mouth once daily. 8/9/23   Emil Real DO       LABS AND IMAGING:     Labs:  Results for orders placed or performed during the hospital encounter of 12/04/23 (from the past 24 hour(s))   CBC and Auto Differential   Result Value Ref Range    WBC 5.8 4.4 - 11.3 x10*3/uL    nRBC 0.0 0.0 - 0.0 /100 WBCs    RBC 4.98 4.50 - 5.90 x10*6/uL    Hemoglobin 15.7 13.5 - 17.5 g/dL    Hematocrit 46.3 41.0 - 52.0 %    MCV 93 80 - 100 fL    MCH 31.5 26.0 - 34.0 pg    MCHC 33.9 32.0 - 36.0 g/dL    RDW 12.2 11.5 - 14.5 %    Platelets 210 150 - 450 x10*3/uL    Neutrophils % 60.0 40.0 - 80.0 %    Immature Granulocytes %, Automated 0.3 0.0 - 0.9 %    Lymphocytes % 28.2 13.0 - 44.0 %    Monocytes % 8.9 2.0 - 10.0 %    Eosinophils % 2.1 0.0 - 6.0 %    Basophils % 0.5 0.0 - 2.0 %    Neutrophils Absolute 3.45 1.20 - 7.70 x10*3/uL    Immature Granulocytes Absolute, Automated 0.02 0.00 - 0.70 x10*3/uL    Lymphocytes Absolute 1.62 1.20 - 4.80 x10*3/uL    Monocytes Absolute 0.51 0.10 - 1.00 x10*3/uL    Eosinophils Absolute 0.12 0.00 - 0.70 x10*3/uL    Basophils Absolute 0.03 0.00 - 0.10 x10*3/uL   Comprehensive Metabolic Panel   Result Value Ref Range    Glucose 90 74 - 99 mg/dL    Sodium 138 136 - 145 mmol/L    Potassium 3.8 3.5 - 5.3 mmol/L    Chloride 106 98 - 107 mmol/L    Bicarbonate 25 21 - 32 mmol/L    Anion Gap 11 10 - 20 mmol/L    Urea  Nitrogen 12 6 - 23 mg/dL    Creatinine 0.89 0.50 - 1.30 mg/dL    eGFR >90 >60 mL/min/1.73m*2    Calcium 9.4 8.6 - 10.3 mg/dL    Albumin 4.6 3.4 - 5.0 g/dL    Alkaline Phosphatase 84 33 - 120 U/L    Total Protein 7.6 6.4 - 8.2 g/dL    AST 19 9 - 39 U/L    Bilirubin, Total 1.0 0.0 - 1.2 mg/dL    ALT 22 10 - 52 U/L   Troponin I, High Sensitivity, Initial   Result Value Ref Range    Troponin I, High Sensitivity 3 0 - 20 ng/L   Sars-CoV-2 and Influenza A/B PCR   Result Value Ref Range    Flu A Result Not Detected Not Detected    Flu B Result Not Detected Not Detected    Coronavirus 2019, PCR Not Detected Not Detected   Troponin, High Sensitivity, 1 Hour   Result Value Ref Range    Troponin I, High Sensitivity 3 0 - 20 ng/L        Imaging:  CT head wo IV contrast, CT angio head w and wo IV contrast  Narrative: Interpreted By:  Tesfaye Orellana,   STUDY:  CT ANGIO HEAD W AND WO IV CONTRAST; CT HEAD WO IV CONTRAST; ;  12/4/2023 1:04 pm      INDICATION:  Signs/Symptoms:dizzy, ha, pain around left eye; dizzy, ha.      COMPARISON:  CT head from 09/16/2010.      ACCESSION NUMBER(S):  [id number]; [id number]      ORDERING CLINICIAN:  PARAS DAS      TECHNIQUE:  Routine unenhanced CT of the head was performed. After administration  of 75 mL Omnipaque 350 intravenously, CTA of the head was performed.  Segmented MIPs are provided.      FINDINGS:  CT head:      There is no acute intracranial hemorrhage or mass effect. There is no  abnormal extra-axial fluid collection.      The ventricles, sulci, and basilar cisterns are normal in size,  shape, and configuration for patient's age. Posterior right lateral  ventricle is slightly expanded in size compared to the left,  unchanged compared to the CT head from 09/16/2010.      Right cerebellar tonsil terminates just below the level of the  foramen magnum.      There are frothy secretions located within the dependent portion of  the right sphenoid sinus, otherwise the visualized  paranasal sinuses  and mastoid air cells are essentially clear.      The calvarium is unremarkable is unremarkable in appearance.      There is an old medial left orbital wall fracture with tenting of the  adjacent left medial rectus muscle.      There is mild asymmetry of the nasal bones with more wavy  configuration of the left nasal bone compared to the right, could be  developmental or in etiology sequela of prior trauma.      CTA head:      Bilateral internal carotid arteries are normal in course and caliber.  Visualized portions of the bilateral anterior and middle cerebral  arteries are normal in course and caliber.      Bilateral intradural vertebral arteries and basilar artery are  diffusely decreased in luminal caliber, likely developmental variant  as there are prominent bilateral posterior communicating arteries.      There are no aneurysms.      No evidence of cavernous venous thrombosis.      CTV head:      Superior sagittal sinus contains multiple small foci of non  opacification which given location probably reflect arachnoid  granulations. Some of these were seen on the preoperative CT head and  demonstrated hypoattenuation. No striking thrombus is seen within the  superior sagittal sinus, torcula, transverse or sigmoid sinuses,  jugular bulbs, the visualized internal jugular veins. The transverse  and sigmoid sinuses and jugular bulb and internal jugular vein are  larger in luminal caliber compared to the left, likely normal  developmental variant. There is an arachnoid granulation located  within the left transverse sigmoid sinus junction.      No thrombus is seen within the straight sinus or the visualized deep  cerebral veins. Small foci of hypoattenuation located within the  straight sinus also likely represent arachnoid granulations.      Impression: 1. No acute intracranial abnormality or mass effect.      2. Unremarkable CTA head.      3. Multiple small filling defects located within the  dural venous  sinuses probably reflect arachnoid granulations. No striking thrombus  is seen within the visualized dural venous sinuses or deep cerebral  veins. No evidence of cavernous sinus thrombosis.      4. Chronic appearing left medial orbital wall fracture, correlate  clinically in this patient with pain around left eye.      5. Nonspecific frothy secretions located within the dependent portion  of the right sphenoid sinus could be an incidental finding but can be  seen with sinusitis, correlate clinically.      This study was interpreted at [facility].      MACRO:  None      Signed by: Tesfaye Orellana 12/4/2023 2:23 PM  Dictation workstation:   [id number]  XR chest 1 view  Narrative: Interpreted By:  Andrei Hammonds,   STUDY:  XR CHEST 1 VIEW;  12/4/2023 11:27 am      INDICATION:  Signs/Symptoms:Chest Pain.      COMPARISON:  Chest radiograph dated 02/06/2023.      ACCESSION NUMBER(S):  [id number]      ORDERING CLINICIAN:  PARAS DAS      FINDINGS:  AP radiograph of the chest was provided.      DEVICES:  None.      CARDIOMEDIASTINAL SILHOUETTE:  Cardiomediastinal silhouette is normal in size and configuration.      LUNGS:  Lungs are clear. No pneumothorax. No pleural effusion.      ABDOMEN:  No remarkable upper abdominal findings.      BONES:  No acute osseous changes.      Impression: No evidence of acute cardiopulmonary process.      MACRO:  None      Signed by: Andrei Hammonds 12/4/2023 11:34 AM  Dictation workstation:   [id number]

## 2023-12-04 NOTE — ED PROVIDER NOTES
HPI   Chief Complaint   Patient presents with    Dizziness     Pt c/o dizziness since Wednesday and today he woke with bad left eye pain and a headache       Patient is a 58-year-old male with history of migraines, GERD who presents for multiple medical complaints.  Patient states that starting on Thursday night he had and an acute onset of dizziness.  States he may have gotten up and started, though he is unsure.  States it was up and down on Friday and then worsened Saturday.  States that starting that he was cutting vegetables and looked up and his dizziness worsened.  States his dizziness is worse with head movement as well as the associated nausea.  Denies any trauma.  Denies chest pain, shortness of breath, fevers, cough, runny nose, sore throat, vomiting, diarrhea, abdominal pain.  States he has had vertigo in the past, when he was on a roller coaster about 20 years ago.  States the dizziness feels off, though sometimes he has a head spinning sensation.  Dors is pain around the left eye and pain in the right side of his head.  States his migraines usually go away with ibuprofen.  States he took some, though this did not resolve.  No contact use.  No significant changes to his vision.  Headache and eye pain. Able to walk.                          No data recorded                Patient History   Past Medical History:   Diagnosis Date    Other specified health status     No pertinent past medical history     Past Surgical History:   Procedure Laterality Date    OTHER SURGICAL HISTORY  10/14/2019    Gallbladder surgery     No family history on file.  Social History     Tobacco Use    Smoking status: Former     Types: Cigars    Smokeless tobacco: Former   Vaping Use    Vaping Use: Never used   Substance Use Topics    Alcohol use: Not Currently    Drug use: Not Currently     Types: Marijuana       Physical Exam   ED Triage Vitals [12/04/23 1004]   Temp Heart Rate Resp BP   36.5 °C (97.7 °F) 72 18 153/88      SpO2  Temp src Heart Rate Source Patient Position   98 % -- -- --      BP Location FiO2 (%)     -- --       Physical Exam  Constitutional:       General: He is not in acute distress.  HENT:      Head: Normocephalic.   Eyes:      Extraocular Movements: Extraocular movements intact.      Conjunctiva/sclera: Conjunctivae normal.      Pupils: Pupils are equal, round, and reactive to light.      Comments: Acuity in left eye 20/30, acuity in right eye 20/30, no fluorescein uptake bilaterally, pressure in the left eye 16, pressure in right eye 17   Cardiovascular:      Rate and Rhythm: Normal rate and regular rhythm.      Pulses: Normal pulses.      Heart sounds: Normal heart sounds.   Pulmonary:      Effort: Pulmonary effort is normal.      Breath sounds: Normal breath sounds.   Abdominal:      General: There is no distension.      Palpations: Abdomen is soft. There is no mass.      Tenderness: There is no abdominal tenderness. There is no guarding.   Musculoskeletal:         General: No deformity.      Cervical back: Normal range of motion and neck supple.      Right lower leg: No edema.      Left lower leg: No edema.   Skin:     General: Skin is warm and dry.      Findings: No lesion or rash.   Neurological:      General: No focal deficit present.      Mental Status: He is alert and oriented to person, place, and time. Mental status is at baseline.      Cranial Nerves: No cranial nerve deficit.      Sensory: No sensory deficit.      Motor: No weakness.      Comments: NIH 0, van negative, test of skew negative, slight nystagmus with head impulse to left   Psychiatric:         Mood and Affect: Mood normal.         ED Course & MDM   Diagnoses as of 12/05/23 0945   Dizziness       Medical Decision Making  EKG mitral rotation: Rate 62, rhythm regular, axis normal, , , QTc 414, T waves: Unremarkable, ST segments: No elevations or depressions, to rotation: Normal sinus rhythm, no STEMI, no arrhythmias    Patient is a  58-year-old male with above-stated past medical history presents for multiple medical complaints.  Patient's EKG is nonischemic and his troponins are negative x2, low suspicion for ACS.  Influenza and COVID are negative.  Chest x-ray does not show signs of pneumonia or pneumothorax and I have low suspicion for these.  Patient is outside the window for TNK, CT head is negative for acute findings.  Given the description of the patient's symptoms there was concern for possible intracranial thrombosis, therefore CT venogram was ordered which was negative.  Patient's left orbital wall fractures are from approximate 40 years ago.  Patient has no findings on his eye exam to suggest a intraorbital process.  He has no signs of entrapment.  On reevaluation, patient continues to endorse feelings of dizziness even when keeping his head still, therefore there was concern for possible stroke.  I discussed the patient's case with the medicine attending who excepted the patient for admission.    Disclaimer: This note was dictated using speech recognition software. Minor errors in transcription may be present. Please call if questions.     Wai Joseph MD  [facility]  Contact on [employer]        Problems Addressed:  Dizziness: acute illness or injury    Amount and/or Complexity of Data Reviewed  Labs: ordered.  Radiology: ordered.  ECG/medicine tests: ordered and independent interpretation performed.        Procedure  Procedures     Wai Joseph MD  12/05/23 8215

## 2023-12-05 VITALS
DIASTOLIC BLOOD PRESSURE: 90 MMHG | SYSTOLIC BLOOD PRESSURE: 127 MMHG | OXYGEN SATURATION: 95 % | WEIGHT: 210 LBS | BODY MASS INDEX: 29.4 KG/M2 | HEART RATE: 80 BPM | RESPIRATION RATE: 14 BRPM | TEMPERATURE: 98.6 F | HEIGHT: 71 IN

## 2023-12-05 PROCEDURE — 99239 HOSP IP/OBS DSCHRG MGMT >30: CPT | Performed by: INTERNAL MEDICINE

## 2023-12-05 PROCEDURE — 97165 OT EVAL LOW COMPLEX 30 MIN: CPT | Mod: GO

## 2023-12-05 PROCEDURE — 97161 PT EVAL LOW COMPLEX 20 MIN: CPT | Mod: GP | Performed by: PHYSICAL THERAPIST

## 2023-12-05 PROCEDURE — 2500000004 HC RX 250 GENERAL PHARMACY W/ HCPCS (ALT 636 FOR OP/ED): Performed by: INTERNAL MEDICINE

## 2023-12-05 PROCEDURE — 99223 1ST HOSP IP/OBS HIGH 75: CPT | Performed by: PSYCHIATRY & NEUROLOGY

## 2023-12-05 PROCEDURE — G0378 HOSPITAL OBSERVATION PER HR: HCPCS

## 2023-12-05 RX ORDER — ONDANSETRON 4 MG/1
4 TABLET, ORALLY DISINTEGRATING ORAL EVERY 8 HOURS PRN
Qty: 20 TABLET | Refills: 0 | Status: SHIPPED | OUTPATIENT
Start: 2023-12-05 | End: 2023-12-08 | Stop reason: ALTCHOICE

## 2023-12-05 RX ORDER — MECLIZINE HYDROCHLORIDE 25 MG/1
25 TABLET ORAL 3 TIMES DAILY PRN
Qty: 30 TABLET | Refills: 0 | Status: SHIPPED | OUTPATIENT
Start: 2023-12-05 | End: 2023-12-19

## 2023-12-05 RX ADMIN — SODIUM CHLORIDE, POTASSIUM CHLORIDE, SODIUM LACTATE AND CALCIUM CHLORIDE 75 ML/HR: 600; 310; 30; 20 INJECTION, SOLUTION INTRAVENOUS at 09:51

## 2023-12-05 RX ADMIN — PANTOPRAZOLE SODIUM 40 MG: 40 TABLET, DELAYED RELEASE ORAL at 06:17

## 2023-12-05 ASSESSMENT — COGNITIVE AND FUNCTIONAL STATUS - GENERAL
MOVING TO AND FROM BED TO CHAIR: A LITTLE
MOBILITY SCORE: 20
WALKING IN HOSPITAL ROOM: A LITTLE
MOVING TO AND FROM BED TO CHAIR: A LITTLE
WALKING IN HOSPITAL ROOM: A LITTLE
MOBILITY SCORE: 20
DAILY ACTIVITIY SCORE: 24
MOBILITY SCORE: 24
DAILY ACTIVITIY SCORE: 24
DAILY ACTIVITIY SCORE: 24
CLIMB 3 TO 5 STEPS WITH RAILING: A LITTLE
STANDING UP FROM CHAIR USING ARMS: A LITTLE
CLIMB 3 TO 5 STEPS WITH RAILING: A LITTLE
STANDING UP FROM CHAIR USING ARMS: A LITTLE

## 2023-12-05 ASSESSMENT — ENCOUNTER SYMPTOMS
SINUS PRESSURE: 0
PHOTOPHOBIA: 0
SPEECH DIFFICULTY: 0
FACIAL ASYMMETRY: 0
COUGH: 0
HEADACHES: 1
FREQUENCY: 0
UNEXPECTED WEIGHT CHANGE: 0
EYE PAIN: 1
TROUBLE SWALLOWING: 0
PALPITATIONS: 0
FEVER: 0
WHEEZING: 0
ARTHRALGIAS: 0
NAUSEA: 0
DIZZINESS: 1
FATIGUE: 0
HALLUCINATIONS: 0
HYPERACTIVE: 0
JOINT SWELLING: 0
CONFUSION: 0
SEIZURES: 0
NECK PAIN: 0
ADENOPATHY: 0
AGITATION: 0
NECK STIFFNESS: 0
ABDOMINAL PAIN: 0
BACK PAIN: 0
VOMITING: 0
WEAKNESS: 0
SLEEP DISTURBANCE: 0
BRUISES/BLEEDS EASILY: 0
TREMORS: 0
NUMBNESS: 0
SHORTNESS OF BREATH: 0
DIFFICULTY URINATING: 0
LIGHT-HEADEDNESS: 1

## 2023-12-05 ASSESSMENT — ACTIVITIES OF DAILY LIVING (ADL): BATHING_ASSISTANCE: INDEPENDENT

## 2023-12-05 NOTE — PROGRESS NOTES
Physical Therapy    Physical Therapy Evaluation    Patient Name: Victor Manuel Sales  MRN: [id number]  Today's Date: 12/5/2023   Time Calculation  Start Time: 0904  Stop Time: 0917  Time Calculation (min): 13 min    Assessment/Plan   PT Assessment  Assessment Comment: Patient moving well no LOB or gait deviaitions. IF symptoms persist may benefit from seeing Vestibular physical therapist for additional  assessment.  End of Session Patient Position: Bed, 2 rail up, Alarm off, not on at start of session  IP OR SWING BED PT PLAN  Inpatient or Swing Bed: Inpatient  PT Plan  PT Plan: PT Eval only  PT Eval Only Reason: No acute PT needs identified  PT Discharge Recommendations:  (Vestiibular OPPT)  PT Recommended Transfer Status: Stand by assist  PT - OK to Discharge: (once deemed medically appropriate)      Subjective   General Visit Information:  General  Reason for Referral: Impaired mobility  Referred By: PT/OT 12/4/23 Jg  Past Medical History Relevant to Rehab: Migraines, GERD, Vertigo, Jeni  Family/Caregiver Present:  (Spouse present)  Patient Position Received: Bed, 2 rail up, Alarm off, not on at start of session  General Comment: To ED 12/4/23/ with dizziness and headache. Thursday started as floating sensation, became spinning on Sunday. Flu A/B (-); C19 (-); CT 12/4/23 Head (-); MRI 12/4/23 Brain (-)  Home Living:  Home Living  Home Living Comments: Per patient  report resides wtih spouse in 2 story home bed/bathroom 2nd floor with handrail. bathroom 1st floor 3 steps no handrail to enter. Walk in shower no seat no grab bar. [employer]. Denies any falls. Drives.       Precautions:  Precautions  Medical Precautions: Fall precautions       Objective   Pain:  Pain Assessment  Pain Score:  (Patient c/o right temporal pain and left periorbital pain increased with looking down. No number shared.)  Cognition:  Cognition  Overall Cognitive Status: Within Functional Limits  Orientation Level: Oriented X4    General  Assessments:  General Observation  General Observation: Patient presents lying in bed. No nystagmus or saccades noted.   Activity Tolerance  Endurance: Endurance does not limit participation in activity    Static Sitting Balance  Static Sitting-Comment/Number of Minutes: Good  Dynamic Sitting Balance  Dynamic Sitting-Comments: Good    Static Standing Balance  Static Standing-Comment/Number of Minutes: Good  Dynamic Standing Balance  Dynamic Standing-Comments: Fair +  Functional Assessments:  Bed Mobility  Bed Mobility:  (Supine <> sit independent)    Transfers  Transfer:  (Transfers sit <> stand independent)    Ambulation/Gait Training  Ambulation/Gait Training Performed:  (Patient ambulated without ' independently. Guarded gait. No LOB)  Extremity/Trunk Assessments:  RUE   RUE : Within Functional Limits  LUE   LUE: Within Functional Limits  RLE   RLE :  (MMT BLE 5/5 grossly; AROM hip/knee/ankle WFL)  LLE   LLE :  (MMT BLE 5/5 grossly; AROM hip/knee/ankle WFL)  Outcome Measures:  [facility] Basic Mobility  Turning from your back to your side while in a flat bed without using bedrails: None  Moving from lying on your back to sitting on the side of a flat bed without using bedrails: None  Moving to and from bed to chair (including a wheelchair): None  Standing up from a chair using your arms (e.g. wheelchair or bedside chair): None  To walk in hospital room: None  Climbing 3-5 steps with railing: None  Basic Mobility - Total Score: 24        Education Documentation  No documentation found.  Education Comments  No comments found.

## 2023-12-05 NOTE — DISCHARGE SUMMARY
Discharge Diagnosis  Vertigo    Issues Requiring Follow-Up  Follow-up with PCP in a week.  Follow-up with neurology in 6 weeks.    Discharge Meds     Your medication list        START taking these medications        Instructions Last Dose Given Next Dose Due   meclizine 25 mg tablet  Commonly known as: Antivert      Take 1 tablet (25 mg) by mouth 3 times a day as needed for dizziness for up to 14 days.       ondansetron ODT 4 mg disintegrating tablet  Commonly known as: Zofran-ODT      Take 1 tablet (4 mg) by mouth every 8 hours if needed for nausea or vomiting for up to 7 days.              CONTINUE taking these medications        Instructions Last Dose Given Next Dose Due   omeprazole 20 mg DR capsule  Commonly known as: PriLOSEC           polyethylene glycol 17 gram/dose powder  Commonly known as: Glycolax, Miralax      Take 17 g by mouth once daily.                 Where to Get Your Medications        These medications were sent to [id number] - [place], [place] - [address]   [address] [facility] [address]      Phone: [phone]   meclizine 25 mg tablet  ondansetron ODT 4 mg disintegrating tablet         Test Results Pending At Discharge  Pending Labs       Order Current Status    Hemoglobin A1c In process            Hospital Course   58M with PMH of migraines, GERD who is presenting with dizziness and HA.  CT of the head without contrast with any acute finding labs are unremarkable MRI brain was done that showed no acute process.  The patient was given meclizine with improvement in symptoms.  The patient was also given IV hydration.  Orthostatics were done that was unremarkable.  Neurology were consulted and its likely benign positional vertigo versus migraine variant.  Recommended the patient continue with meclizine and Zofran.  The patient to follow-up with his PCP and neurology in 6 weeks.  Patient otherwise stable for discharge.    Pertinent Physical Exam At Time of Discharge  Physical  Exam  Constitutional:       Appearance: Normal appearance.   HENT:      Head: Normocephalic and atraumatic.      Nose: Nose normal.      Mouth/Throat:      Mouth: Mucous membranes are dry.   Eyes:      Extraocular Movements: Extraocular movements intact.      Conjunctiva/sclera: Conjunctivae normal.   Cardiovascular:      Rate and Rhythm: Normal rate and regular rhythm.      Pulses: Normal pulses.      Heart sounds: Normal heart sounds.   Pulmonary:      Effort: Pulmonary effort is normal.      Breath sounds: Normal breath sounds.   Abdominal:      General: Bowel sounds are normal.      Palpations: Abdomen is soft.   Musculoskeletal:         General: Normal range of motion.      Cervical back: Normal range of motion and neck supple.   Skin:     General: Skin is warm and dry.   Neurological:      General: No focal deficit present.      Mental Status: He is alert and oriented to person, place, and time. Mental status is at baseline.   Psychiatric:         Mood and Affect: Mood normal.         Outpatient Follow-Up  No future appointments.      Fernando Delvalle MD

## 2023-12-05 NOTE — CONSULTS
History Of Present Illness  Victor Manuel Sales is a 58 y.o. male presenting with history of nausea vomiting and not feeling right.  According to the patient he was not feeling right on Friday with he was complaining of lightheadedness with upset stomach.  And then when he was trying to cut peppers and tried to look up the whole room was spinning and he became very diaphoretic and nauseated.  No loss of consciousness but the same time he complained of severe headache on the left side as if his left eye was going to pop out.  He came to the ER yesterday and had a scan and an MRI which did not show any infarction or bleed    At the time of my evaluation he is complaining of lightheadedness but most of the symptoms is resolved and he was given migraine cocktail including high-dose Tylenol with Benadryl and then Toradol as well as Imitrex and those medications seems to work well for him    He has a longstanding history of migraine headaches and was taking Advil or Aleve as needed which occur once or twice a month    Please refer to the initial H&P and the ER records for details.    Past Medical History  Past Medical History:   Diagnosis Date    Other specified health status     No pertinent past medical history       Surgical History  Past Surgical History:   Procedure Laterality Date    CT HEAD ANGIO W AND WO IV CONTRAST  12/4/2023    CT HEAD ANGIO W AND WO IV CONTRAST 12/4/2023 [facility]    OTHER SURGICAL HISTORY  10/14/2019    Gallbladder surgery       Social History  Social History     Tobacco Use    Smoking status: Former     Types: Cigars    Smokeless tobacco: Former   Vaping Use    Vaping Use: Never used   Substance Use Topics    Alcohol use: Not Currently    Drug use: Not Currently     Types: Marijuana       Allergies  Patient has no known allergies.    Medications Prior to Admission   Medication Sig Dispense Refill Last Dose    omeprazole (PriLOSEC) 20 mg DR capsule Take 1 capsule (20 mg) by mouth once daily.        polyethylene glycol (Glycolax, Miralax) 17 gram/dose powder Take 17 g by mouth once daily. 595 g 1        Review of Systems   Constitutional:  Negative for fatigue, fever and unexpected weight change.   HENT:  Negative for dental problem, ear pain, hearing loss, sinus pressure, tinnitus and trouble swallowing.    Eyes:  Positive for pain. Negative for photophobia and visual disturbance.   Respiratory:  Negative for cough, shortness of breath and wheezing.    Cardiovascular:  Negative for chest pain, palpitations and leg swelling.   Gastrointestinal:  Negative for abdominal pain, nausea and vomiting.   Genitourinary:  Negative for difficulty urinating, enuresis and frequency.   Musculoskeletal:  Negative for arthralgias, back pain, joint swelling, neck pain and neck stiffness.   Skin:  Negative for pallor and rash.   Allergic/Immunologic: Negative for food allergies.   Neurological:  Positive for dizziness, light-headedness and headaches. Negative for tremors, seizures, syncope, facial asymmetry, speech difficulty, weakness and numbness.   Hematological:  Negative for adenopathy. Does not bruise/bleed easily.   Psychiatric/Behavioral:  Negative for agitation, behavioral problems, confusion, hallucinations and sleep disturbance. The patient is not hyperactive.        Physical ExamNeurological Exam  Constitutional: General appearance: no acute distress   Auscultation of Heart: Regular rate and rhythm, no murmurs, normal S1 and S2.   Carotid Arteries: Intact without any bruits.   Neck is supple.   No lymph adenopathy.   Peripheral Vascular Exam: Pulses +2 and equal in all extremities. No swelling, varicosities, edema or tenderness to palpations.    Abdomen is soft, nondistended. No organomegaly.  Mental status: The patient was in no distress, alert, interactive and cooperative. Affect is appropriate.   Orientation: oriented to person, oriented to place and oriented to time.   Memory: recent memory intact and remote  memory intact.   Attention: normal attention span and normal concentrating ability.   Language: normal comprehension and no difficulty naming common objects.   Fund of knowledge: Patient displays adequate knowledge of current events, adequate fund of knowledge regarding past history and adequate fund of knowledge regarding vocabulary.   Eyes: The ophthalmoscopic examination was normal. The fundi are visualized with normal disc margins and without.  Cranial nerve II: Visual fields full to confrontation.   Cranial nerves III, IV, and VI: Pupils round, equally reactive to light; no ptosis. EOMs intact. No nystagmus.   Cranial Nerve V: Facial sensation intact bilaterally.   Cranial nerve VII: Normal and symmetric facial strength.   Cranial nerve VIII: Hearing is intact bilaterally to finger rub / whisper.   Cranial nerves IX and X: Palate elevates symmetrically.   Cranial nerve XI: Shoulder shrug and neck rotation strength are intact.   Cranial nerve XII: Tongue midline with normal strength.   Motor: Motor exam was normal. Muscle bulk was normal in both upper and lower extremities. Muscle tone was normal in both upper and lower extremities. Muscle strength was 5/5 throughout. no abnormal or adventitious movements were present.   Deep Tendon Reflexes: left biceps 2+ , right biceps 2+, left triceps 2+, right triceps 2+, left brachioradialis 2+, right brachioradialis 2+, left patella 2+, right patella 2+, left ankle jerk 2+, right ankle jerk 2+   Plantar Reflex: Toes downgoing to plantar stimulation on the left. Toes downgoing to plantar stimulation on the right.   Sensory Exam: Normal to light touch.   Coordination: There is no limb dystaxia and rapid alternating movements are intact.  Gait: Gait is normal without spasticity, ataxia or bradykinesia. Stance is stable with a negative Romberg.  Last Recorded Vitals  Blood pressure 127/90, pulse 80, temperature 37 °C (98.6 °F), temperature source Temporal, resp. rate 14,  "height 1.803 m (5' 11\"), weight 95.3 kg (210 lb), SpO2 95 %.    Relevant Results  Recent Results (from the past 24 hour(s))   Troponin, High Sensitivity, 1 Hour    Collection Time: 12/04/23  1:33 PM   Result Value Ref Range    Troponin I, High Sensitivity 3 0 - 20 ng/L                       [place] Coma Scale  Best Eye Response: Spontaneous  Best Verbal Response: Oriented  Best Motor Response: Follows commands  Jonas Coma Scale Score: 15                 MR brain wo IV contrast    Result Date: 12/4/2023  Interpreted By:  Venkatesh Naranjo, STUDY: MR BRAIN WO IV CONTRAST;  12/4/2023 7:23 pm   INDICATION: Signs/Symptoms:persistent dizziness/vertigo, r/o posterior circulation stroke.   COMPARISON: None.   ACCESSION NUMBER(S): [id number]   ORDERING CLINICIAN: JUAN SHAW   TECHNIQUE: Axial T2, FLAIR, DWI, gradient echo T2 and sagittal and coronal T1 weighted images of brain were acquired.   FINDINGS: CSF Spaces: The ventricles, sulci and basal cisterns are within normal limits.   Parenchyma: There is no diffusion restriction abnormality to suggest acute infarct.  There is no focal parenchymal signal abnormality. There is no mass effect or midline shift.   Paranasal Sinuses and Mastoids: Minimal ethmoid sinus mucosal thickening.. Mild sphenoid sinus fluid       No evidence of acute infarct, intracranial mass effect or midline shift.   MACRO: None   Signed by: Venkatesh Naranjo 12/4/2023 8:17 PM Dictation workstation:   [id number]    CT head wo IV contrast    Result Date: 12/4/2023  Interpreted By:  Tesfaye Orellana, STUDY: CT ANGIO HEAD W AND WO IV CONTRAST; CT HEAD WO IV CONTRAST; ; 12/4/2023 1:04 pm   INDICATION: Signs/Symptoms:dizzy, ha, pain around left eye; dizzy, ha.   COMPARISON: CT head from 09/16/2010.   ACCESSION NUMBER(S): [id number]; [id number]   ORDERING CLINICIAN: PARAS DAS   TECHNIQUE: Routine unenhanced CT of the head was performed. After administration of 75 mL Omnipaque 350 intravenously, CTA " of the head was performed. Segmented MIPs are provided.   FINDINGS: CT head:   There is no acute intracranial hemorrhage or mass effect. There is no abnormal extra-axial fluid collection.   The ventricles, sulci, and basilar cisterns are normal in size, shape, and configuration for patient's age. Posterior right lateral ventricle is slightly expanded in size compared to the left, unchanged compared to the CT head from 09/16/2010.   Right cerebellar tonsil terminates just below the level of the foramen magnum.   There are frothy secretions located within the dependent portion of the right sphenoid sinus, otherwise the visualized paranasal sinuses and mastoid air cells are essentially clear.   The calvarium is unremarkable is unremarkable in appearance.   There is an old medial left orbital wall fracture with tenting of the adjacent left medial rectus muscle.   There is mild asymmetry of the nasal bones with more wavy configuration of the left nasal bone compared to the right, could be developmental or in etiology sequela of prior trauma.   CTA head:   Bilateral internal carotid arteries are normal in course and caliber. Visualized portions of the bilateral anterior and middle cerebral arteries are normal in course and caliber.   Bilateral intradural vertebral arteries and basilar artery are diffusely decreased in luminal caliber, likely developmental variant as there are prominent bilateral posterior communicating arteries.   There are no aneurysms.   No evidence of cavernous venous thrombosis.   CTV head:   Superior sagittal sinus contains multiple small foci of non opacification which given location probably reflect arachnoid granulations. Some of these were seen on the preoperative CT head and demonstrated hypoattenuation. No striking thrombus is seen within the superior sagittal sinus, torcula, transverse or sigmoid sinuses, jugular bulbs, the visualized internal jugular veins. The transverse and sigmoid sinuses  and jugular bulb and internal jugular vein are larger in luminal caliber compared to the left, likely normal developmental variant. There is an arachnoid granulation located within the left transverse sigmoid sinus junction.   No thrombus is seen within the straight sinus or the visualized deep cerebral veins. Small foci of hypoattenuation located within the straight sinus also likely represent arachnoid granulations.       1. No acute intracranial abnormality or mass effect.   2. Unremarkable CTA head.   3. Multiple small filling defects located within the dural venous sinuses probably reflect arachnoid granulations. No striking thrombus is seen within the visualized dural venous sinuses or deep cerebral veins. No evidence of cavernous sinus thrombosis.   4. Chronic appearing left medial orbital wall fracture, correlate clinically in this patient with pain around left eye.   5. Nonspecific frothy secretions located within the dependent portion of the right sphenoid sinus could be an incidental finding but can be seen with sinusitis, correlate clinically.   This study was interpreted at [facility].   MACRO: None   Signed by: Tesfaye Orellana 12/4/2023 2:23 PM Dictation workstation:   [id number]    CT angio head w and wo IV contrast    Result Date: 12/4/2023  Interpreted By:  Tesfaye Orellana, STUDY: CT ANGIO HEAD W AND WO IV CONTRAST; CT HEAD WO IV CONTRAST; ; 12/4/2023 1:04 pm   INDICATION: Signs/Symptoms:dizzy, ha, pain around left eye; dizzy, ha.   COMPARISON: CT head from 09/16/2010.   ACCESSION NUMBER(S): [id number]; [id number]   ORDERING CLINICIAN: PARAS DAS   TECHNIQUE: Routine unenhanced CT of the head was performed. After administration of 75 mL Omnipaque 350 intravenously, CTA of the head was performed. Segmented MIPs are provided.   FINDINGS: CT head:   There is no acute intracranial hemorrhage or mass effect. There is no abnormal extra-axial fluid collection.   The  ventricles, sulci, and basilar cisterns are normal in size, shape, and configuration for patient's age. Posterior right lateral ventricle is slightly expanded in size compared to the left, unchanged compared to the CT head from 09/16/2010.   Right cerebellar tonsil terminates just below the level of the foramen magnum.   There are frothy secretions located within the dependent portion of the right sphenoid sinus, otherwise the visualized paranasal sinuses and mastoid air cells are essentially clear.   The calvarium is unremarkable is unremarkable in appearance.   There is an old medial left orbital wall fracture with tenting of the adjacent left medial rectus muscle.   There is mild asymmetry of the nasal bones with more wavy configuration of the left nasal bone compared to the right, could be developmental or in etiology sequela of prior trauma.   CTA head:   Bilateral internal carotid arteries are normal in course and caliber. Visualized portions of the bilateral anterior and middle cerebral arteries are normal in course and caliber.   Bilateral intradural vertebral arteries and basilar artery are diffusely decreased in luminal caliber, likely developmental variant as there are prominent bilateral posterior communicating arteries.   There are no aneurysms.   No evidence of cavernous venous thrombosis.   CTV head:   Superior sagittal sinus contains multiple small foci of non opacification which given location probably reflect arachnoid granulations. Some of these were seen on the preoperative CT head and demonstrated hypoattenuation. No striking thrombus is seen within the superior sagittal sinus, torcula, transverse or sigmoid sinuses, jugular bulbs, the visualized internal jugular veins. The transverse and sigmoid sinuses and jugular bulb and internal jugular vein are larger in luminal caliber compared to the left, likely normal developmental variant. There is an arachnoid granulation located within the left  transverse sigmoid sinus junction.   No thrombus is seen within the straight sinus or the visualized deep cerebral veins. Small foci of hypoattenuation located within the straight sinus also likely represent arachnoid granulations.       1. No acute intracranial abnormality or mass effect.   2. Unremarkable CTA head.   3. Multiple small filling defects located within the dural venous sinuses probably reflect arachnoid granulations. No striking thrombus is seen within the visualized dural venous sinuses or deep cerebral veins. No evidence of cavernous sinus thrombosis.   4. Chronic appearing left medial orbital wall fracture, correlate clinically in this patient with pain around left eye.   5. Nonspecific frothy secretions located within the dependent portion of the right sphenoid sinus could be an incidental finding but can be seen with sinusitis, correlate clinically.   This study was interpreted at [facility].   MACRO: None   Signed by: Tesfaye Orellana 12/4/2023 2:23 PM Dictation workstation:   [id number]    XR chest 1 view    Result Date: 12/4/2023  Interpreted By:  Andrei Hammonds, STUDY: XR CHEST 1 VIEW;  12/4/2023 11:27 am   INDICATION: Signs/Symptoms:Chest Pain.   COMPARISON: Chest radiograph dated 02/06/2023.   ACCESSION NUMBER(S): [id number]   ORDERING CLINICIAN: PARAS DAS   FINDINGS: AP radiograph of the chest was provided.   DEVICES: None.   CARDIOMEDIASTINAL SILHOUETTE: Cardiomediastinal silhouette is normal in size and configuration.   LUNGS: Lungs are clear. No pneumothorax. No pleural effusion.   ABDOMEN: No remarkable upper abdominal findings.   BONES: No acute osseous changes.       No evidence of acute cardiopulmonary process.   MACRO: None   Signed by: Andrei Hammonds 12/4/2023 11:34 AM Dictation workstation:   [id number]     I have personally reviewed the following imaging results MR brain wo IV contrast    Result Date: 12/4/2023  Interpreted By:  Jose A  Venkatesh, STUDY: MR BRAIN WO IV CONTRAST;  12/4/2023 7:23 pm   INDICATION: Signs/Symptoms:persistent dizziness/vertigo, r/o posterior circulation stroke.   COMPARISON: None.   ACCESSION NUMBER(S): [id number]   ORDERING CLINICIAN: JUAN SHAW   TECHNIQUE: Axial T2, FLAIR, DWI, gradient echo T2 and sagittal and coronal T1 weighted images of brain were acquired.   FINDINGS: CSF Spaces: The ventricles, sulci and basal cisterns are within normal limits.   Parenchyma: There is no diffusion restriction abnormality to suggest acute infarct.  There is no focal parenchymal signal abnormality. There is no mass effect or midline shift.   Paranasal Sinuses and Mastoids: Minimal ethmoid sinus mucosal thickening.. Mild sphenoid sinus fluid       No evidence of acute infarct, intracranial mass effect or midline shift.   MACRO: None   Signed by: Venkatesh Naranjo 12/4/2023 8:17 PM Dictation workstation:   [id number]    CT head wo IV contrast    Result Date: 12/4/2023  Interpreted By:  Tesfaye Orellana, STUDY: CT ANGIO HEAD W AND WO IV CONTRAST; CT HEAD WO IV CONTRAST; ; 12/4/2023 1:04 pm   INDICATION: Signs/Symptoms:dizzy, ha, pain around left eye; dizzy, ha.   COMPARISON: CT head from 09/16/2010.   ACCESSION NUMBER(S): [id number]; [id number]   ORDERING CLINICIAN: PARAS DAS   TECHNIQUE: Routine unenhanced CT of the head was performed. After administration of 75 mL Omnipaque 350 intravenously, CTA of the head was performed. Segmented MIPs are provided.   FINDINGS: CT head:   There is no acute intracranial hemorrhage or mass effect. There is no abnormal extra-axial fluid collection.   The ventricles, sulci, and basilar cisterns are normal in size, shape, and configuration for patient's age. Posterior right lateral ventricle is slightly expanded in size compared to the left, unchanged compared to the CT head from 09/16/2010.   Right cerebellar tonsil terminates just below the level of the foramen magnum.   There are frothy  secretions located within the dependent portion of the right sphenoid sinus, otherwise the visualized paranasal sinuses and mastoid air cells are essentially clear.   The calvarium is unremarkable is unremarkable in appearance.   There is an old medial left orbital wall fracture with tenting of the adjacent left medial rectus muscle.   There is mild asymmetry of the nasal bones with more wavy configuration of the left nasal bone compared to the right, could be developmental or in etiology sequela of prior trauma.   CTA head:   Bilateral internal carotid arteries are normal in course and caliber. Visualized portions of the bilateral anterior and middle cerebral arteries are normal in course and caliber.   Bilateral intradural vertebral arteries and basilar artery are diffusely decreased in luminal caliber, likely developmental variant as there are prominent bilateral posterior communicating arteries.   There are no aneurysms.   No evidence of cavernous venous thrombosis.   CTV head:   Superior sagittal sinus contains multiple small foci of non opacification which given location probably reflect arachnoid granulations. Some of these were seen on the preoperative CT head and demonstrated hypoattenuation. No striking thrombus is seen within the superior sagittal sinus, torcula, transverse or sigmoid sinuses, jugular bulbs, the visualized internal jugular veins. The transverse and sigmoid sinuses and jugular bulb and internal jugular vein are larger in luminal caliber compared to the left, likely normal developmental variant. There is an arachnoid granulation located within the left transverse sigmoid sinus junction.   No thrombus is seen within the straight sinus or the visualized deep cerebral veins. Small foci of hypoattenuation located within the straight sinus also likely represent arachnoid granulations.       1. No acute intracranial abnormality or mass effect.   2. Unremarkable CTA head.   3. Multiple small  filling defects located within the dural venous sinuses probably reflect arachnoid granulations. No striking thrombus is seen within the visualized dural venous sinuses or deep cerebral veins. No evidence of cavernous sinus thrombosis.   4. Chronic appearing left medial orbital wall fracture, correlate clinically in this patient with pain around left eye.   5. Nonspecific frothy secretions located within the dependent portion of the right sphenoid sinus could be an incidental finding but can be seen with sinusitis, correlate clinically.   This study was interpreted at [facility].   MACRO: None   Signed by: Tesfaye Orellana 12/4/2023 2:23 PM Dictation workstation:   [id number]    CT angio head w and wo IV contrast    Result Date: 12/4/2023  Interpreted By:  Tesfaye Orellana, STUDY: CT ANGIO HEAD W AND WO IV CONTRAST; CT HEAD WO IV CONTRAST; ; 12/4/2023 1:04 pm   INDICATION: Signs/Symptoms:dizzy, ha, pain around left eye; dizzy, ha.   COMPARISON: CT head from 09/16/2010.   ACCESSION NUMBER(S): [id number]; [id number]   ORDERING CLINICIAN: PARAS DAS   TECHNIQUE: Routine unenhanced CT of the head was performed. After administration of 75 mL Omnipaque 350 intravenously, CTA of the head was performed. Segmented MIPs are provided.   FINDINGS: CT head:   There is no acute intracranial hemorrhage or mass effect. There is no abnormal extra-axial fluid collection.   The ventricles, sulci, and basilar cisterns are normal in size, shape, and configuration for patient's age. Posterior right lateral ventricle is slightly expanded in size compared to the left, unchanged compared to the CT head from 09/16/2010.   Right cerebellar tonsil terminates just below the level of the foramen magnum.   There are frothy secretions located within the dependent portion of the right sphenoid sinus, otherwise the visualized paranasal sinuses and mastoid air cells are essentially clear.   The calvarium is  unremarkable is unremarkable in appearance.   There is an old medial left orbital wall fracture with tenting of the adjacent left medial rectus muscle.   There is mild asymmetry of the nasal bones with more wavy configuration of the left nasal bone compared to the right, could be developmental or in etiology sequela of prior trauma.   CTA head:   Bilateral internal carotid arteries are normal in course and caliber. Visualized portions of the bilateral anterior and middle cerebral arteries are normal in course and caliber.   Bilateral intradural vertebral arteries and basilar artery are diffusely decreased in luminal caliber, likely developmental variant as there are prominent bilateral posterior communicating arteries.   There are no aneurysms.   No evidence of cavernous venous thrombosis.   CTV head:   Superior sagittal sinus contains multiple small foci of non opacification which given location probably reflect arachnoid granulations. Some of these were seen on the preoperative CT head and demonstrated hypoattenuation. No striking thrombus is seen within the superior sagittal sinus, torcula, transverse or sigmoid sinuses, jugular bulbs, the visualized internal jugular veins. The transverse and sigmoid sinuses and jugular bulb and internal jugular vein are larger in luminal caliber compared to the left, likely normal developmental variant. There is an arachnoid granulation located within the left transverse sigmoid sinus junction.   No thrombus is seen within the straight sinus or the visualized deep cerebral veins. Small foci of hypoattenuation located within the straight sinus also likely represent arachnoid granulations.       1. No acute intracranial abnormality or mass effect.   2. Unremarkable CTA head.   3. Multiple small filling defects located within the dural venous sinuses probably reflect arachnoid granulations. No striking thrombus is seen within the visualized dural venous sinuses or deep cerebral  veins. No evidence of cavernous sinus thrombosis.   4. Chronic appearing left medial orbital wall fracture, correlate clinically in this patient with pain around left eye.   5. Nonspecific frothy secretions located within the dependent portion of the right sphenoid sinus could be an incidental finding but can be seen with sinusitis, correlate clinically.   This study was interpreted at [facility].   MACRO: None   Signed by: Tesfaye Orellana 12/4/2023 2:23 PM Dictation workstation:   [id number]    XR chest 1 view    Result Date: 12/4/2023  Interpreted By:  Andrei Hammonds, STUDY: XR CHEST 1 VIEW;  12/4/2023 11:27 am   INDICATION: Signs/Symptoms:Chest Pain.   COMPARISON: Chest radiograph dated 02/06/2023.   ACCESSION NUMBER(S): [id number]   ORDERING CLINICIAN: PARAS DAS   FINDINGS: AP radiograph of the chest was provided.   DEVICES: None.   CARDIOMEDIASTINAL SILHOUETTE: Cardiomediastinal silhouette is normal in size and configuration.   LUNGS: Lungs are clear. No pneumothorax. No pleural effusion.   ABDOMEN: No remarkable upper abdominal findings.   BONES: No acute osseous changes.       No evidence of acute cardiopulmonary process.   MACRO: None   Signed by: Andrei Hammonds 12/4/2023 11:34 AM Dictation workstation:   [id number]  .      Assessment/Plan   History of vertigo with nausea and headache most likely benign positional vertigo versus migraine variant.    2.  History of migraine headache without aura.    Plan.  Continue with current treatment including IV or p.o. hydration.  Use Antivert 25 mg 3 times a day at least for 2 to 3 days and then as needed for vertigo.  Use Zofran if patient is having any nausea and I would advise patient to continue with Aleve 2 tablets as needed for acute headache any repeat in 4 to 8 hours and keep a headache diary and food diary.  I discussed the signs and the risk factor for CVA bleeding risk and the precautions which patient understood.  I  did review the CT angio head and neck CAT scan as well as MRI which were all unremarkable.  Patient was discussed about the importance of sleep and hygiene and if patient continues to have dizziness may benefit from vestibular rehab    Okay to discharge home on Zofran and Antivert and can follow-up with us in 2 to 3 months.  [id number]    Due to technical limitations of voice recognition and human error, this note may not accurately reflect the care of the patient.               Jalen Zheng MD

## 2023-12-05 NOTE — PROGRESS NOTES
"Occupational Therapy    Evaluation/Treatment    Patient Name: Victor Manuel Sales  MRN: [id number]  : 1965  Today's Date: 23  Time Calculation  Start Time: 905  Stop Time: 917  Time Calculation (min): 12 min       Assessment:  End of Session Patient Position: Bed, 2 rail up, Alarm off, not on at start of session (call light in reach)       Plan:  No Skilled OT: No acute OT goals identified  OT Frequency:  (eval only)  OT Discharge Recommendations: No OT needed after discharge  OT - OK to Discharge when deemed medically appropriate     Subjective     Current Problem:  1. Dizziness  meclizine (Antivert) 25 mg tablet    ondansetron ODT (Zofran-ODT) 4 mg disintegrating tablet        Past Medical History:   Diagnosis Date    Other specified health status     No pertinent past medical history     Past Surgical History:   Procedure Laterality Date    CT HEAD ANGIO W AND WO IV CONTRAST  2023    CT HEAD ANGIO W AND WO IV CONTRAST 2023 [facility]    OTHER SURGICAL HISTORY  10/14/2019    Gallbladder surgery       General:   OT Received On: 23  General  Reason for Referral: ADL impairment  Referred By: PT/OT 23 Jg  Past Medical History Relevant to Rehab: Migraines, GERD, Vertigo, Jeni  Family/Caregiver Present: Yes (wife present)  Patient Position Received: Bed, 2 rail up, Alarm off, not on at start of session  General Comment: To ED 23/ with dizziness and headache. Thursday started as floating sensation, became spinning on . Flu A/B (-); C19 (-); CT 23 Head (-); MRI 23 Brain (-) Pt reports overall improvement in symptoms, but feels like R side of head has been hit by \"2x4\" and L eye has pressure when he looks down    Precautions:  Medical Precautions: Fall precautions         Pain:  Pain Assessment  Pain Score:  (Patient c/o right temporal pain and left periorbital pain increased with looking down. No number shared.)  Objective     Cognition:  Overall Cognitive Status: " Within Functional Limits  Orientation Level: Oriented X4             Home Living:  Home Living Comments: Per patient  report resides wtih spouse in 2 story home bed/bathroom 2nd floor with handrail. bathroom 1st floor 3 steps no handrail to enter. Walk in shower no seat no grab bar. Works [employer]. Denies any falls. Drives.    Prior Function: Pt indep with ADLS and IADLs            Activities of Daily Living:   Eating Assistance: Independent  Grooming Assistance: Independent  Bathing Assistance: Independent  UE Dressing Assistance: Independent  LE Dressing Assistance: Independent  Toileting Assistance with Device: Independent  Functional Assistance:  (pt ambulated 100' indep without AD)                         Activity Tolerance:  Endurance: Endurance does not limit participation in activity           Bed Mobility/Transfers: Bed Mobility  Bed Mobility:  (sup to sit to sup indep)  Transfers  Transfer:  (sit to stand to sit at EOB indep)                Balance:  Static Sitting: good  Dynamic Sitting: good  Static Standing: good  Dynamic Standing: good -      Vision:Vision - Basic Assessment  Current Vision: No visual deficits               Strength:  Strength Comments: B UE >3/5                     Extremities: RUE   RUE : Within Functional Limits and LUE   LUE: Within Functional Limits    Outcome Measures: [facility] Daily Activity  Putting on and taking off regular lower body clothing: None  Bathing (including washing, rinsing, drying): None  Putting on and taking off regular upper body clothing: None  Toileting, which includes using toilet, bedpan or urinal: None  Taking care of personal grooming such as brushing teeth: None  Eating Meals: None  Daily Activity - Total Score: 24    Education Documentation  ADL Training, taught by Maria Dolores Church OT at 12/5/2023  1:23 PM.  Learner: Patient  Readiness: Acceptance  Method: Explanation  Response: Verbalizes Understanding

## 2023-12-06 ENCOUNTER — PATIENT OUTREACH (OUTPATIENT)
Dept: PRIMARY CARE | Facility: CLINIC | Age: 58
End: 2023-12-06
Payer: COMMERCIAL

## 2023-12-06 LAB
EST. AVERAGE GLUCOSE BLD GHB EST-MCNC: 108 MG/DL
HBA1C MFR BLD: 5.4 %

## 2023-12-06 NOTE — PROGRESS NOTES
Discharge Facility: [facility]  Discharge Diagnosis: vertigo  Admission Date: 12/4/2023  Discharge Date: 12/5/2023    PCP Appointment Date: 12/8/2023  Specialist Appointment Date: Schedule an appointment with Jalen Zheng MD as soon as possible for a visit in 6 week(s)   Hospital Encounter and Summary: Linked   See discharge assessment below for further details    Engagement  Call Start Time: 0912 (12/6/2023  9:12 AM)    Medications  Medications reviewed with patient/caregiver?: Yes (12/6/2023  9:12 AM)  Is the patient having any side effects they believe may be caused by any medication additions or changes?: No (12/6/2023  9:12 AM)  Does the patient have all medications ordered at discharge?: Yes (12/6/2023  9:12 AM)  Care Management Interventions: No intervention needed (12/6/2023  9:12 AM)  Prescription Comments: new: meclizine, zofran (12/6/2023  9:12 AM)  Is the patient taking all medications as directed (includes completed medication regime)?: Yes (12/6/2023  9:12 AM)  Medication Comments: see med list (12/6/2023  9:12 AM)    Appointments  Does the patient have a primary care provider?: Yes (12/6/2023  9:12 AM)  Care Management Interventions: Verified appointment date/time/provider (12/6/2023  9:12 AM)  Has the patient kept scheduled appointments due by today?: Yes (12/6/2023  9:12 AM)  Care Management Interventions: Advised patient to keep appointment (12/6/2023  9:12 AM)    Self Management  What is the home health agency?: none (12/6/2023  9:12 AM)  Has home health visited the patient within 72 hours of discharge?: Not applicable (12/6/2023  9:12 AM)    Patient Teaching  Does the patient have access to their discharge instructions?: Yes (12/6/2023  9:12 AM)  Care Management Interventions: Reviewed instructions with patient (12/6/2023  9:12 AM)  What is the patient's perception of their health status since discharge?: Improving (12/6/2023  9:12 AM)  Is the patient/caregiver able to teach back the hierarchy of  who to call/visit for symptoms/problems? PCP, Specialist, Home Health nurse, Urgent Care, ED, 911: Yes (12/6/2023  9:12 AM)    Wrap Up  Wrap Up Additional Comments: CTS spoke with patient. He stated that he is feeling a little better. Still having a little dizziness. Patient does deny nausea, vomiting and headaches. Patient feels that the meds prescribed at helping. Patient is aware of the date and time of PCP follow up. No questions or concerns at this time. (12/6/2023  9:12 AM)  Call End Time: 0915 (12/6/2023  9:12 AM)

## 2023-12-07 ENCOUNTER — TELEPHONE (OUTPATIENT)
Dept: PRIMARY CARE | Facility: CLINIC | Age: 58
End: 2023-12-07
Payer: COMMERCIAL

## 2023-12-08 ENCOUNTER — OFFICE VISIT (OUTPATIENT)
Dept: PRIMARY CARE | Facility: CLINIC | Age: 58
End: 2023-12-08
Payer: COMMERCIAL

## 2023-12-08 ENCOUNTER — TELEPHONE (OUTPATIENT)
Dept: PRIMARY CARE | Facility: CLINIC | Age: 58
End: 2023-12-08

## 2023-12-08 VITALS
HEIGHT: 71 IN | HEART RATE: 72 BPM | TEMPERATURE: 97 F | OXYGEN SATURATION: 97 % | BODY MASS INDEX: 29.82 KG/M2 | WEIGHT: 213 LBS | RESPIRATION RATE: 18 BRPM | SYSTOLIC BLOOD PRESSURE: 114 MMHG | DIASTOLIC BLOOD PRESSURE: 80 MMHG

## 2023-12-08 DIAGNOSIS — J01.30 ACUTE NON-RECURRENT SPHENOIDAL SINUSITIS: Primary | ICD-10-CM

## 2023-12-08 PROBLEM — M19.071 PRIMARY OSTEOARTHRITIS OF RIGHT FOOT: Status: ACTIVE | Noted: 2019-10-30

## 2023-12-08 PROBLEM — M19.071 ARTHRITIS OF RIGHT SUBTALAR JOINT: Status: ACTIVE | Noted: 2020-12-21

## 2023-12-08 PROBLEM — M20.42 HAMMERTOE OF LEFT FOOT: Status: ACTIVE | Noted: 2019-10-30

## 2023-12-08 PROBLEM — R26.2 DIFFICULTY WALKING: Status: ACTIVE | Noted: 2020-03-18

## 2023-12-08 PROBLEM — M19.92 POST-TRAUMATIC ARTHROSIS OF JOINT: Status: ACTIVE | Noted: 2020-03-18

## 2023-12-08 PROBLEM — G57.62 MORTON'S NEUROMA OF LEFT FOOT: Status: ACTIVE | Noted: 2020-03-18

## 2023-12-08 PROCEDURE — 1036F TOBACCO NON-USER: CPT | Performed by: FAMILY MEDICINE

## 2023-12-08 PROCEDURE — 99214 OFFICE O/P EST MOD 30 MIN: CPT | Performed by: FAMILY MEDICINE

## 2023-12-08 RX ORDER — AMOXICILLIN AND CLAVULANATE POTASSIUM 875; 125 MG/1; MG/1
1 TABLET, FILM COATED ORAL 2 TIMES DAILY
Qty: 14 TABLET | Refills: 1 | Status: SHIPPED | OUTPATIENT
Start: 2023-12-08 | End: 2023-12-15

## 2023-12-08 ASSESSMENT — ENCOUNTER SYMPTOMS
DIZZINESS: 0
FACIAL SWELLING: 1

## 2023-12-08 NOTE — PROGRESS NOTES
Subjective   Patient ID: Victor Manuel Sales is a 58 y.o. male who presents for Hospital Follow-up.  HPI  Patient here for follow-up after a 1 day admission to [facility] after evaluation for vertigo.  According to medical records he was seen in the emergency department on 12/4/2023 at that time he noted that he had approximately 1 week prior onset of dizziness with subsequent nausea worse by head movement he had no trauma had no upper respiratory symptoms he had evaluation in the emergency department with complete workup including EKG and troponins and CT of the head along with MRI.  Findings were notable for sphenoid fluid question sphenoid sinusitis he was discharged home on Antivert and here for follow-up.  Additional lab testing in the emergency department showed negative CBC with normal hemoglobin 15.7 hematocrit 46.3 WBC 5.8 platelets 210,000 with CMP showing electrolytes normal kidney function and hemoglobin A1c 5.4 with COVID and flu test being unremarkable.  Review of Systems   HENT:  Positive for facial swelling.    Neurological:  Negative for dizziness.     cardiovascular:  no  palpitations or chest  pain  respiratory: no  shortness  of  breath  endocrine:  no polydipsia,  no polyuria  musculoskeletal:  no  myalgia.. no arthralgia  All other  systems discussed  negative   Objective   Physical Exam  Vitals: I have reviewed the vitals  General: Well-developed.  In no acute distress.  Eyes:   sclera nonicteric.  Conjunctiva not injected.  No discharge.   HEAD: Normocephalic, atraumatic.  HEENT   Mucous membranes moist.  Posterior oropharynx nonerythematous, no tonsillar exudates.      No cervical lymphadenopathy.  Cardio: Regular rate and rhythm.  No murmur, rub or gallop.  Pulmonary: Lungs clear to auscultation in all fields.  No accessory muscle use.  GI/: Normal active bowel sounds.  Soft, nontender.  No masses or organomegaly appreciated.  Musculoskeletal: No gross deformities appreciated.  Neuro:  Alert, age-appropriate.  Normal muscle tone.  Moving all extremities.  Skin: No rash, bruises or lesions.   Labs    MR brain wo IV contrast  Status: Final result     PACS Images     Show images for MR brain wo IV contrast  Signed by    Signed Time Phone Pager   Venkatesh Naranjo MD 12/04/2023 20:17 [phone]     Exam Information    Status Exam Begun Exam Ended   Final 12/04/2023 18:50 12/04/2023 19:23     Study Result    Narrative & Impression   Interpreted By:  Venkatesh Naranjo,   STUDY:  MR BRAIN WO IV CONTRAST;  12/4/2023 7:23 pm      INDICATION:  Signs/Symptoms:persistent dizziness/vertigo, r/o posterior  circulation stroke.      COMPARISON:  None.      ACCESSION NUMBER(S):  [id number]      ORDERING CLINICIAN:  JUAN SHAW      TECHNIQUE:  Axial T2, FLAIR, DWI, gradient echo T2 and sagittal and coronal T1  weighted images of brain were acquired.      FINDINGS:  CSF Spaces: The ventricles, sulci and basal cisterns are within  normal limits.      Parenchyma: There is no diffusion restriction abnormality to suggest  acute infarct.  There is no focal parenchymal signal abnormality.  There is no mass effect or midline shift.      Paranasal Sinuses and Mastoids: Minimal ethmoid sinus mucosal  thickening.. Mild sphenoid sinus fluid      IMPRESSION:  No evidence of acute infarct, intracranial mass effect or midline  shift.      MACRO:  None      Signed by: Venkatesh Naranjo 12/4/2023 8:17 PM  Dictation workstation:   [id number]     CT head wo IV contrast  CT angio head w and wo IV contrast  Status: Final result     PACS Images     Show images for CT head wo IV contrast  Signed by    Signed Time Phone Pager   Tesfaye Orellana MD 12/04/2023 14:23 [phone]     Exam Information    Status Exam Begun Exam Ended   Final 12/04/2023 12:57 12/04/2023 13:04     Study Result    Narrative & Impression   Interpreted By:  Tesfaye Orellana,   STUDY:  CT ANGIO HEAD W AND WO IV CONTRAST; CT HEAD WO IV CONTRAST;  ;  12/4/2023 1:04 pm      INDICATION:  Signs/Symptoms:dizzy, ha, pain around left eye; dizzy, ha.      COMPARISON:  CT head from 09/16/2010.      ACCESSION NUMBER(S):  [id number]; [id number]      ORDERING CLINICIAN:  PARAS DAS      TECHNIQUE:  Routine unenhanced CT of the head was performed. After administration  of 75 mL Omnipaque 350 intravenously, CTA of the head was performed.  Segmented MIPs are provided.      FINDINGS:  CT head:      There is no acute intracranial hemorrhage or mass effect. There is no  abnormal extra-axial fluid collection.      The ventricles, sulci, and basilar cisterns are normal in size,  shape, and configuration for patient's age. Posterior right lateral  ventricle is slightly expanded in size compared to the left,  unchanged compared to the CT head from 09/16/2010.      Right cerebellar tonsil terminates just below the level of the  foramen magnum.      There are frothy secretions located within the dependent portion of  the right sphenoid sinus, otherwise the visualized paranasal sinuses  and mastoid air cells are essentially clear.      The calvarium is unremarkable is unremarkable in appearance.      There is an old medial left orbital wall fracture with tenting of the  adjacent left medial rectus muscle.      There is mild asymmetry of the nasal bones with more wavy  configuration of the left nasal bone compared to the right, could be  developmental or in etiology sequela of prior trauma.      CTA head:      Bilateral internal carotid arteries are normal in course and caliber.  Visualized portions of the bilateral anterior and middle cerebral  arteries are normal in course and caliber.      Bilateral intradural vertebral arteries and basilar artery are  diffusely decreased in luminal caliber, likely developmental variant  as there are prominent bilateral posterior communicating arteries.      There are no aneurysms.      No evidence of cavernous venous thrombosis.      CTV  head:      Superior sagittal sinus contains multiple small foci of non  opacification which given location probably reflect arachnoid  granulations. Some of these were seen on the preoperative CT head and  demonstrated hypoattenuation. No striking thrombus is seen within the  superior sagittal sinus, torcula, transverse or sigmoid sinuses,  jugular bulbs, the visualized internal jugular veins. The transverse  and sigmoid sinuses and jugular bulb and internal jugular vein are  larger in luminal caliber compared to the left, likely normal  developmental variant. There is an arachnoid granulation located  within the left transverse sigmoid sinus junction.      No thrombus is seen within the straight sinus or the visualized deep  cerebral veins. Small foci of hypoattenuation located within the  straight sinus also likely represent arachnoid granulations.      IMPRESSION:  1. No acute intracranial abnormality or mass effect.      2. Unremarkable CTA head.      3. Multiple small filling defects located within the dural venous  sinuses probably reflect arachnoid granulations. No striking thrombus  is seen within the visualized dural venous sinuses or deep cerebral  veins. No evidence of cavernous sinus thrombosis.      4. Chronic appearing left medial orbital wall fracture, correlate  clinically in this patient with pain around left eye.      5. Nonspecific frothy secretions located within the dependent portion  of the right sphenoid sinus could be an incidental finding but can be  seen with sinusitis, correlate clinically.      This study was interpreted at [facility].      MACRO:  None      Signed by: Tesfaye Orellana 12/4/2023 2:23 PM  Dictation workstation:   [id number]       Assessment/Plan   Problem List Items Addressed This Visit       Acute non-recurrent sphenoidal sinusitis - Primary     Review of testing and based on patient's symptoms along with findings on both CT and MRI I  suspect this is sinus infection.  Uncertain if this was triggered from atypical flu or COVID negative COVID will treat with antibiotic 7 days with 1 refill if not 100% advised to continue.  Note for work given advised COVID vitamin prophylaxis see wrap-up and recheck in 4 weeks and patient encouraged to continue vitamin supplementation indefinitely.

## 2023-12-08 NOTE — PATIENT INSTRUCTIONS
Please consider exercise program involving walking or some other form of aerobic activity 5 days weekly for 30 minutes... Let's also consider strengthening of large muscle groups like the abdominal muscles or shoulder muscles... Twice weekly with reps of 5/10/15 exercises and gradually increase strength.. This is not heavy strength training but light weight training... Sit ups or back exercise routine.. Please ask for handout if uncertain how to do..This  will help to strengthen your muscles which in turn will help you to lose weight.... You might ask what is the best diet available.. I would strongly encourage you to consider  Weight Watchers.. And as  your  fellow on  Weight Watchers physician attempting to  live this  LIFE  style  choice with you....  I will be glad to give you recommendations on what to eat.. Consider buying Brigette bread.., susana bagle thin bread.. oikos yogurt... eggs  to eat as hard-boiled... Halo top ice cream for snack... All these are delicious foods which.. when eaten and  being compliant eating three  meals daily  breakfast lunch and dinner, drinking  64 ounces of water daily we will all win together !!!!!!!. This will be a means for you to lose weight... Consider also the smart phone home ... My plate.. Or My  fitness  pal..,  as additional possibilities for weight loss... Good  lucsoco Real!    Discussed medication side effects.  The  risk benefits and treatment options  discussed with patient.       Please schedule follow-up appointment based upon your improvement/failure to improve/chronic medical conditions and physician recommendations during office appointment at the .       Patient advised to go to er if symptoms worsen or to call answering service, or to return to office for additional evaluation    This note was partially  generated using Dragon voice recognition and there may be incorrect words, wording, spelling, or pronunciation errors that were not  corrected prior to committing the note to the medical record.        Fully vaccinated patients (boosted or completed primary series of Pfizer or Moderna vaccine within 6 months were completed primary series of J&J within the past 2 months (wearing mask around others for 10 days    Test on day 5 if possible    If symptoms develop to stay home and follow isolation requirements below    Not vaccinated or are more than 6 months out from second dose of Pfizer or Moderna and 9 boosted, or completed the primary J&J over 2 months ago and not boosted    Remain home for 5 days    Continue to wear a mask around other for additional 5 days    If unable to quarantine and wear a well fitting mask for 10 days    Test on day 5 if possible    If symptoms develop get tested, stay on, and follow isolation requirements below    Isolation regardless of vaccination status patients with known COVID 19    Asymptomatic    Remain at home for 5 days after first positive test (day 0 being the date that her specimen was collected for the positive test)    Continue to wear a well fitting mask for an additional 5 days beyond the 5-day isolation.    If symptoms develop after a positive test, the 5-day isolation she is started over (day 0 changes to the first day of symptoms)    Mild illness remain home for 5 days isolation can and with the following; at least 24 hours resolution of fever without using fever reducing medications and there is improvement of symptoms    For a well fitting mask for an additional 5 days    Moderate illness isolate for 10 days    Isolation can end with the following: At least 24 hours resolution of fever without using fever reducing medications and wear a well fitting mask for additional 5 days    Severe illness isolate for 10 to 20 days depending on severity isolation can end with the following; at least 10 and up to 20 days have passed since onset of symptoms and at least 24 hours resolution of fever without using  fever reducing medications and there is improvement of symptoms    For patients were severely ill or severely compromised patient with severe illness (e.g. requiring hospitalization intensive care or ventilation support) may produce replication competent virus beyond 10 days that may be warrants extending the duration of isolation and precautions for up to 20 days (day 0 his first day of symptoms or positive viral test)    A test based strategy can be considered in consultation with infectious disease experts    Recommend follow-up with higher level of care if symptoms worsen (e.g. increased fever, difficulty breathing, coughing up blood, or shortness of breath)        Patients with new, persistent, or progressive symptoms lasting greater than 12 weeks should be referred to specialist    Recommend the COVID-vaccine to all eligible patients   Please consider the following medications to help    mitigate  Covid during this time  Vitamin  mg   daily  B complex daily  ZINC   30   MG  DAILY     VITAMIN D 3  200O IU DAILY        Multivitamin with zinc  Extra rest  Stress reduction  IN  SYMPTOMATIC  PATIENTS, MONITORING WITH  HOME PULSE OXIMETRY  IS RECOMMENDED..  AMBULATORY  DESATURATIONS BELOW  94%  SHOULD PROMPT HOSPITAL  ADMISSSION

## 2023-12-08 NOTE — LETTER
December 8, 2023     Patient: Victor Manuel Sales   YOB: 1965   Date of Visit: 12/8/2023       To Whom It May Concern:    Victor Manuel Sales was seen in my clinic on 12/8/2023 at ?. Please excuse Victor Manuel for his absence from work 12-04-23 through 12-08-23. He may return to work on 12-11/23    If you have any questions or concerns, please don't hesitate to call.         Sincerely,         Emil Real, DO           please follow up with your doctor in 2-3 days.    drink plenty of fluids.   return to ED for any concerns

## 2023-12-08 NOTE — ASSESSMENT & PLAN NOTE
Review of testing and based on patient's symptoms along with findings on both CT and MRI I suspect this is sinus infection.  Uncertain if this was triggered from atypical flu or COVID negative COVID will treat with antibiotic 7 days with 1 refill if not 100% advised to continue.  Note for work given advised COVID vitamin prophylaxis see wrap-up and recheck in 4 weeks and patient encouraged to continue vitamin supplementation indefinitely.

## 2023-12-11 ENCOUNTER — TELEPHONE (OUTPATIENT)
Dept: PRIMARY CARE | Facility: CLINIC | Age: 58
End: 2023-12-11
Payer: COMMERCIAL

## 2023-12-11 NOTE — TELEPHONE ENCOUNTER
Pt called in requesting letter to return to work as of today 12/11/23 -   Per Dr. Real pt to be excused from 12/4/23-12/10/23 with a return date of 12/11/23.  Reviewed paperwork via phone with patient and Dr. Rela.   Separate letter faxed to [employer] (Pt's Employer)   @ [phone] as well as [facility]   Pt has been notified taken care of

## 2023-12-11 NOTE — TELEPHONE ENCOUNTER
Patient needs a work note with the dates of 12/04/23 through 12/10/23 with a return date of 12/11/23.  Also needs to state with no restrictions and a dx     Please Advise what is the DX and if no restrictions are ok    Thank you

## 2023-12-11 NOTE — LETTER
December 11, 2023     Patient: Victor Manuel Sales   YOB: 1965   Date of Visit: 12/11/2023       To Whom It May Concern:    Victor Manuel Sales was seen in my clinic on 12/08/23 for Vertigo and Sinusitis.  Please excuse Victor Manuel for his absence from work 12/04/23 through 12/10/23. He may return to work on 12/11/23 with No Restrictions    If you have any questions or concerns, please don't hesitate to call.         Sincerely,         Emil Real, DO

## 2023-12-13 ENCOUNTER — PATIENT OUTREACH (OUTPATIENT)
Dept: PRIMARY CARE | Facility: CLINIC | Age: 58
End: 2023-12-13
Payer: COMMERCIAL

## 2024-01-09 ENCOUNTER — APPOINTMENT (OUTPATIENT)
Dept: PRIMARY CARE | Facility: CLINIC | Age: 59
End: 2024-01-09
Payer: COMMERCIAL

## 2024-01-12 ENCOUNTER — PATIENT OUTREACH (OUTPATIENT)
Dept: PRIMARY CARE | Facility: CLINIC | Age: 59
End: 2024-01-12
Payer: COMMERCIAL

## 2024-01-12 NOTE — PROGRESS NOTES
Call placed regarding one month post discharge follow up call.  At time of outreach call the patient feels as if their condition has improved since initial visit with PCP or specialist.  Questions or concerns regarding recovery period addressed at this time. Reviewed any PCP or specialists progress notes/labs/radiology reports if applicable and addressed any questions or concerns.    89 y/o F w/PMHx of HTN, CAD s/p stent, COPD, hypothyroidism presented to the ED with intermittent abdominal pain with associated intermittent non bloody diarrhea and 30 lb weight loss over past 5 weeks. Pt admitted for gastroenteritis that resolved then developed SIRS with fevers and leukocytosis started on 8/10 and AMS, metabolic encephelopathy. Persistent symptoms of pharyngeal impairment; therefore recommended MBS

## 2024-01-29 ENCOUNTER — APPOINTMENT (OUTPATIENT)
Dept: ORTHOPEDIC SURGERY | Facility: CLINIC | Age: 59
End: 2024-01-29
Payer: COMMERCIAL

## 2024-02-09 ENCOUNTER — PATIENT OUTREACH (OUTPATIENT)
Dept: PRIMARY CARE | Facility: CLINIC | Age: 59
End: 2024-02-09
Payer: COMMERCIAL

## 2024-06-25 ENCOUNTER — OFFICE VISIT (OUTPATIENT)
Dept: ORTHOPEDIC SURGERY | Facility: CLINIC | Age: 59
End: 2024-06-25
Payer: COMMERCIAL

## 2024-06-25 ENCOUNTER — HOSPITAL ENCOUNTER (OUTPATIENT)
Dept: RADIOLOGY | Facility: CLINIC | Age: 59
Discharge: HOME | End: 2024-06-25
Payer: COMMERCIAL

## 2024-06-25 DIAGNOSIS — M25.572 PAIN IN LATERAL PORTION OF LEFT ANKLE: ICD-10-CM

## 2024-06-25 DIAGNOSIS — S90.00XA CONTUSION OF ANKLE, INITIAL ENCOUNTER: ICD-10-CM

## 2024-06-25 DIAGNOSIS — S99.912A LEFT ANKLE INJURY, INITIAL ENCOUNTER: Primary | ICD-10-CM

## 2024-06-25 DIAGNOSIS — M25.472 LEFT ANKLE SWELLING: ICD-10-CM

## 2024-06-25 PROCEDURE — L1902 AFO ANKLE GAUNTLET PRE OTS: HCPCS | Performed by: FAMILY MEDICINE

## 2024-06-25 PROCEDURE — 99204 OFFICE O/P NEW MOD 45 MIN: CPT | Performed by: FAMILY MEDICINE

## 2024-06-25 PROCEDURE — 99214 OFFICE O/P EST MOD 30 MIN: CPT | Performed by: FAMILY MEDICINE

## 2024-06-25 PROCEDURE — 1036F TOBACCO NON-USER: CPT | Performed by: FAMILY MEDICINE

## 2024-06-25 PROCEDURE — L4361 PNEUMA/VAC WALK BOOT PRE OTS: HCPCS | Performed by: FAMILY MEDICINE

## 2024-06-25 PROCEDURE — 73610 X-RAY EXAM OF ANKLE: CPT | Mod: LEFT SIDE | Performed by: FAMILY MEDICINE

## 2024-06-25 PROCEDURE — 73610 X-RAY EXAM OF ANKLE: CPT | Mod: LT

## 2024-06-25 RX ORDER — METHYLPREDNISOLONE 4 MG/1
TABLET ORAL
Qty: 1 EACH | Refills: 0 | Status: CANCELLED | OUTPATIENT
Start: 2024-06-25

## 2024-06-25 RX ORDER — METHYLPREDNISOLONE 4 MG/1
TABLET ORAL
Qty: 1 EACH | Refills: 0 | Status: SHIPPED | OUTPATIENT
Start: 2024-06-25

## 2024-06-25 RX ORDER — NAPROXEN 500 MG/1
500 TABLET ORAL
Qty: 28 TABLET | Refills: 0 | Status: SHIPPED | OUTPATIENT
Start: 2024-06-25 | End: 2024-07-09

## 2024-06-25 NOTE — PROGRESS NOTES
Acute Injury New Patient Visit    CC: No chief complaint on file.      HPI: Victor Manuel is a 58 y.o.male who presents today with new complaints of pain and discomfort to the left ankle.  He states it slammed it up against a hard object.  He has significant swelling and discomfort points to the distal fibula and lateral soft tissues of the foot and ankle as area most pain and discomfort.  He does walk about 4 miles and is on his feet during a normal workday he presents here today for further evaluation of his left ankle pain and swelling.  Denies any obvious slip trip or fall.  No history of any significant injury to the past to the left ankle.        Review of Systems   GENERAL: Negative for malaise, significant weight loss, fever  MUSCULOSKELETAL: See HPI  NEURO: Negative for numbness / tingling     Past Medical History  Past Medical History:   Diagnosis Date    Other specified health status     No pertinent past medical history    Visual impairment 11/30/2023       Medication review  Medication Documentation Review Audit       Reviewed by Cole C Budinsky, MD (Physician) on 06/25/24 at 1223      Medication Order Taking? Sig Documenting Provider Last Dose Status   methylPREDNISolone (Medrol Dospak) 4 mg tablets [id number]  Follow schedule on package instructions Cole C Budinsky, MD  Active   naproxen (Naprosyn) 500 mg tablet [id number]  Take 1 tablet (500 mg) by mouth 2 times daily (morning and late afternoon) for 14 days. Cole C Budinsky, MD  Active   omeprazole (PriLOSEC) 20 mg DR capsule [id number] No Take 1 capsule (20 mg) by mouth once daily. Historical Provider, MD Taking Active                    Allergies  No Known Allergies    Social History  Social History     Socioeconomic History    Marital status:      Spouse name: Not on file    Number of children: Not on file    Years of education: Not on file    Highest education level: Not on file   Occupational History    Not on file   Tobacco Use    Smoking  status: Former     Types: Cigars    Smokeless tobacco: Former   Vaping Use    Vaping status: Never Used   Substance and Sexual Activity    Alcohol use: Not Currently    Drug use: Not Currently     Types: Marijuana     Comment: Before bedtime.    Sexual activity: Yes     Partners: Female   Other Topics Concern    Not on file   Social History Narrative    Not on file     Social Determinants of Health     Financial Resource Strain: Low Risk  (12/4/2023)    Overall Financial Resource Strain (CARDIA)     Difficulty of Paying Living Expenses: Not hard at all   Food Insecurity: Not on file   Transportation Needs: No Transportation Needs (12/4/2023)    PRAPARE - Transportation     Lack of Transportation (Medical): No     Lack of Transportation (Non-Medical): No   Physical Activity: Not on file   Stress: Not on file   Social Connections: Not on file   Intimate Partner Violence: Not on file   Housing Stability: Low Risk  (12/4/2023)    Housing Stability Vital Sign     Unable to Pay for Housing in the Last Year: No     Number of Places Lived in the Last Year: 1     Unstable Housing in the Last Year: No       Surgical History  Past Surgical History:   Procedure Laterality Date    CHOLECYSTECTOMY  ?    CT HEAD ANGIO W AND WO IV CONTRAST  12/04/2023    CT HEAD ANGIO W AND WO IV CONTRAST 12/4/2023 [facility]    OTHER SURGICAL HISTORY  10/14/2019    Gallbladder surgery    TONSILLECTOMY  ?       Physical Exam:  GENERAL:  Patient is awake, alert, and oriented to person place and time.  Patient appears well nourished and well kept.  Affect Calm, Not Acutely Distressed.  HEENT:  Normocephalic, Atraumatic, EOMI  CARDIOVASCULAR:  Hemodynamically stable.  RESPIRATORY:  Normal respirations with unlabored breathing.  NEURO: Gross sensation intact to the lower extremities bilaterally.  Extremity: Left ankle exam demonstrates skin which is warm pink well-perfused soft tissue swelling is Tenderness over the distal fibula ATFL CFL ligament pain  lateral soft tissue discomfort throughout the lateral midfoot and proximal metatarsals.  Negative distal metatarsal squeeze limited plantarflexion dorsiflexion eversion inversion no laxity with anterior drawer tenderness over the peroneals.      Diagnostics: X-rays today demonstrate soft tissue swelling no obvious presence for fracture or dislocation        Procedure: None  Procedures    Assessment:   Problem List Items Addressed This Visit    None  Visit Diagnoses       Left ankle injury, initial encounter    -  Primary    Relevant Medications    naproxen (Naprosyn) 500 mg tablet    methylPREDNISolone (Medrol Dospak) 4 mg tablets    Other Relevant Orders    Walking boot    Ankle Brace, Lace Up or A60    Pain in lateral portion of left ankle        Relevant Medications    naproxen (Naprosyn) 500 mg tablet    methylPREDNISolone (Medrol Dospak) 4 mg tablets    Other Relevant Orders    XR ankle left 3+ views    Walking boot    Ankle Brace, Lace Up or A60    Contusion of ankle, initial encounter        Relevant Medications    naproxen (Naprosyn) 500 mg tablet    methylPREDNISolone (Medrol Dospak) 4 mg tablets    Other Relevant Orders    Walking boot    Ankle Brace, Lace Up or A60    Left ankle swelling        Relevant Medications    naproxen (Naprosyn) 500 mg tablet    methylPREDNISolone (Medrol Dospak) 4 mg tablets    Other Relevant Orders    Walking boot    Ankle Brace, Lace Up or A60             Plan: At this time we will offer the patient a walking boot in addition to a lace up ankle brace that he can slowly transition into as able.  Will offer him a steroid pack anti-inflammatory in addition to home exercises.  Will hold off on any formal PT.  Will see him back in 2 to 3 weeks for repeat evaluation if still having bony tenderness we will repeat x-rays to rule out occult fracture of the left ankle at that time.  We can also consider soft tissue injection if necessary for any persistent swelling and/or pain.  Orders  Placed This Encounter    Walking boot    Ankle Brace, Lace Up or A60    XR ankle left 3+ views    naproxen (Naprosyn) 500 mg tablet    methylPREDNISolone (Medrol Dospak) 4 mg tablets      At the conclusion of the visit there were no further questions by the patient/family regarding their plan of care.  Patient was instructed to call or return with any issues, questions, or concerns regarding their injury and/or treatment plan described above.     06/25/24 at 12:24 PM - Cole C Budinsky, MD    Office: [phone]    This note was prepared using voice recognition software.  The details of this note are correct and have been reviewed, and corrected to the best of my ability.  Some grammatical errors may persist related to the Dragon software.

## 2024-07-01 ENCOUNTER — APPOINTMENT (OUTPATIENT)
Dept: PRIMARY CARE | Facility: CLINIC | Age: 59
End: 2024-07-01
Payer: COMMERCIAL

## 2024-07-01 VITALS
HEART RATE: 64 BPM | OXYGEN SATURATION: 96 % | SYSTOLIC BLOOD PRESSURE: 105 MMHG | TEMPERATURE: 98.5 F | DIASTOLIC BLOOD PRESSURE: 73 MMHG | RESPIRATION RATE: 18 BRPM | WEIGHT: 209 LBS | BODY MASS INDEX: 29.26 KG/M2 | HEIGHT: 71 IN

## 2024-07-01 DIAGNOSIS — K21.9 GASTROESOPHAGEAL REFLUX DISEASE, UNSPECIFIED WHETHER ESOPHAGITIS PRESENT: Primary | ICD-10-CM

## 2024-07-01 DIAGNOSIS — S93.499D SPRAIN OF ANTERIOR TALOFIBULAR LIGAMENT, UNSPECIFIED LATERALITY, SUBSEQUENT ENCOUNTER: ICD-10-CM

## 2024-07-01 DIAGNOSIS — S93.409A GRADE 2 ANKLE SPRAIN: ICD-10-CM

## 2024-07-01 PROCEDURE — 99213 OFFICE O/P EST LOW 20 MIN: CPT | Performed by: FAMILY MEDICINE

## 2024-07-01 NOTE — ASSESSMENT & PLAN NOTE
Patient had injury of the left ankle.  According to patient he excellently hit his ankle against a chair... With inversion injury and pain and swelling.  He went to local urgent care and had imaging studies performed dated 6/25/2024.  He was given on boot/walking to use and had relief in pain with mobilization applied ice elevation and was given instructions.  There was noted in the examination significant swelling discomfort of the distal fibula and lateral soft tissues of the foot and ankle.  Patient was given Medrol dose pack along with Naprosyn for treatment and there was notable tenderness over his anterior talofibular and posterior talofibular ligament.  Negative squeeze testing no obvious fracture was noted with similar finding on radiologist report although soft tissue swelling was noted. GO TO PT . . WEAR BRACE     LACE UP AT WORK

## 2024-07-01 NOTE — PROGRESS NOTES
Subjective   Patient ID: Victor Manuel Sales is a 58 y.o. male who presents for Ankle Pain (HIT ON CHAIR  2 WEEKS AGO-   C/O SWOLLEN AND PAINFUL ).  HPI  Patient had injury of the left ankle.  According to patient he excellently hit his ankle against a chair... With inversion injury and pain and swelling.  He went to local urgent care and had imaging studies performed dated 6/25/2024.  He was given on boot/walking to use and had relief in pain with mobilization applied ice elevation and was given instructions.  There was noted in the examination significant swelling discomfort of the distal fibula and lateral soft tissues of the foot and ankle.  Patient was given Medrol dose pack along with Naprosyn for treatment and there was notable tenderness over his anterior talofibular and posterior talofibular ligament.  Negative squeeze testing no obvious fracture was noted with similar finding on radiologist report although soft tissue swelling was noted.  Patient is here today for voice recommendations.  Review of Systems  All other  pertinent  systems reviewed and are negative except  those  mentioned  in HPI    Objective   Physical Exam  general: alert oriented x three  HEENT hearing normal to voice  Neck supple  Lungs respirations non-labored.  Cardiovascular: no peripheral edema  Skin: warm and dry without rash  Psych: judgement and insight normal  Musculoskeletal:  ambulation normal, left ankle with tenderness over lateral malleolus most specifically anterior talofibular area posterior to talofibular ligaments slight remaining edema noted without evidence of ecchymosis.  lymph:negative cervical  LYMPADENOPATHY  thyroid: non palpable enlargement   Labs  Last 12 months   Admission on 12/04/2023, Discharged on 12/05/2023   Component Date Value Ref Range Status    WBC 12/04/2023 5.8  4.4 - 11.3 x10*3/uL Final    nRBC 12/04/2023 0.0  0.0 - 0.0 /100 WBCs Final    RBC 12/04/2023 4.98  4.50 - 5.90 x10*6/uL Final    Hemoglobin  12/04/2023 15.7  13.5 - 17.5 g/dL Final    Hematocrit 12/04/2023 46.3  41.0 - 52.0 % Final    MCV 12/04/2023 93  80 - 100 fL Final    MCH 12/04/2023 31.5  26.0 - 34.0 pg Final    MCHC 12/04/2023 33.9  32.0 - 36.0 g/dL Final    RDW 12/04/2023 12.2  11.5 - 14.5 % Final    Platelets 12/04/2023 210  150 - 450 x10*3/uL Final    Neutrophils % 12/04/2023 60.0  40.0 - 80.0 % Final    Immature Granulocytes %, Automated 12/04/2023 0.3  0.0 - 0.9 % Final    Immature Granulocyte Count (IG) includes promyelocytes, myelocytes and metamyelocytes but does not include bands. Percent differential counts (%) should be interpreted in the context of the absolute cell counts (cells/UL).    Lymphocytes % 12/04/2023 28.2  13.0 - 44.0 % Final    Monocytes % 12/04/2023 8.9  2.0 - 10.0 % Final    Eosinophils % 12/04/2023 2.1  0.0 - 6.0 % Final    Basophils % 12/04/2023 0.5  0.0 - 2.0 % Final    Neutrophils Absolute 12/04/2023 3.45  1.20 - 7.70 x10*3/uL Final    Percent differential counts (%) should be interpreted in the context of the absolute cell counts (cells/uL).    Immature Granulocytes Absolute, Au* 12/04/2023 0.02  0.00 - 0.70 x10*3/uL Final    Lymphocytes Absolute 12/04/2023 1.62  1.20 - 4.80 x10*3/uL Final    Monocytes Absolute 12/04/2023 0.51  0.10 - 1.00 x10*3/uL Final    Eosinophils Absolute 12/04/2023 0.12  0.00 - 0.70 x10*3/uL Final    Basophils Absolute 12/04/2023 0.03  0.00 - 0.10 x10*3/uL Final    Glucose 12/04/2023 90  74 - 99 mg/dL Final    Sodium 12/04/2023 138  136 - 145 mmol/L Final    Potassium 12/04/2023 3.8  3.5 - 5.3 mmol/L Final    Chloride 12/04/2023 106  98 - 107 mmol/L Final    Bicarbonate 12/04/2023 25  21 - 32 mmol/L Final    Anion Gap 12/04/2023 11  10 - 20 mmol/L Final    Urea Nitrogen 12/04/2023 12  6 - 23 mg/dL Final    Creatinine 12/04/2023 0.89  0.50 - 1.30 mg/dL Final    eGFR 12/04/2023 >90  >60 mL/min/1.73m*2 Final    Calculations of estimated GFR are performed using the 2021 CKD-EPI Study Refit  equation without the race variable for the IDMS-Traceable creatinine methods.  https://jasn.asnjournals.org/content/early/2021/09/22/ASN.7591339956    Calcium 12/04/2023 9.4  8.6 - 10.3 mg/dL Final    Albumin 12/04/2023 4.6  3.4 - 5.0 g/dL Final    Alkaline Phosphatase 12/04/2023 84  33 - 120 U/L Final    Total Protein 12/04/2023 7.6  6.4 - 8.2 g/dL Final    AST 12/04/2023 19  9 - 39 U/L Final    Bilirubin, Total 12/04/2023 1.0  0.0 - 1.2 mg/dL Final    ALT 12/04/2023 22  10 - 52 U/L Final    Patients treated with Sulfasalazine may generate falsely decreased results for ALT.    Flu A Result 12/04/2023 Not Detected  Not Detected Final    Flu B Result 12/04/2023 Not Detected  Not Detected Final    Coronavirus 2019, PCR 12/04/2023 Not Detected  Not Detected Final    Troponin I, High Sensitivity 12/04/2023 3  0 - 20 ng/L Final    Troponin I, High Sensitivity 12/04/2023 3  0 - 20 ng/L Final    Hemoglobin A1C 12/04/2023 5.4  see below % Final    Estimated Average Glucose 12/04/2023 108  Not Established mg/dL Final         Assessment/Plan   Problem List Items Addressed This Visit       GERD (gastroesophageal reflux disease) - Primary    Relevant Orders    CBC and Auto Differential    Comprehensive Metabolic Panel    Magnesium    Grade 2 ankle sprain     Patient had injury of the left ankle.  According to patient he excellently hit his ankle against a chair... With inversion injury and pain and swelling.  He went to local urgent care and had imaging studies performed dated 6/25/2024.  He was given on boot/walking to use and had relief in pain with mobilization applied ice elevation and was given instructions.  There was noted in the examination significant swelling discomfort of the distal fibula and lateral soft tissues of the foot and ankle.  Patient was given Medrol dose pack along with Naprosyn for treatment and there was notable tenderness over his anterior talofibular and posterior talofibular ligament.  Negative  squeeze testing no obvious fracture was noted with similar finding on radiologist report although soft tissue swelling was noted. GO TO PT . . WEAR BRACE     LACE UP AT WORK           Other Visit Diagnoses       Sprain of anterior talofibular ligament, unspecified laterality, subsequent encounter        Relevant Orders    Referral to Physical Therapy

## 2024-07-01 NOTE — ASSESSMENT & PLAN NOTE
The reflux esophagitis he has been occurring in a recurrent pattern for years. The course has been without change. The reflux is described as mild to moderate. The patient remains compliant on meds.. The patient takes medications in a stepup stepdown fashion. Denies dysphagia hoarseness or odynophagia...  stable and continue meds

## 2024-07-01 NOTE — PATIENT INSTRUCTIONS

## 2024-07-26 ENCOUNTER — OFFICE VISIT (OUTPATIENT)
Dept: PRIMARY CARE | Facility: CLINIC | Age: 59
End: 2024-07-26
Payer: COMMERCIAL

## 2024-07-26 VITALS
SYSTOLIC BLOOD PRESSURE: 128 MMHG | OXYGEN SATURATION: 98 % | DIASTOLIC BLOOD PRESSURE: 83 MMHG | HEART RATE: 70 BPM | WEIGHT: 207 LBS | RESPIRATION RATE: 18 BRPM | TEMPERATURE: 97.5 F | BODY MASS INDEX: 28.87 KG/M2

## 2024-07-26 DIAGNOSIS — S93.409A GRADE 2 ANKLE SPRAIN: Primary | ICD-10-CM

## 2024-07-26 PROCEDURE — 1036F TOBACCO NON-USER: CPT

## 2024-07-26 PROCEDURE — 99213 OFFICE O/P EST LOW 20 MIN: CPT

## 2024-07-26 RX ORDER — NAPROXEN 500 MG/1
500 TABLET ORAL 2 TIMES DAILY PRN
Qty: 20 TABLET | Refills: 0 | Status: SHIPPED | OUTPATIENT
Start: 2024-07-26 | End: 2024-08-15

## 2024-07-26 NOTE — LETTER
July 26, 2024     Patient: Victor Manuel Sales   YOB: 1965   Date of Visit: 7/26/2024       To Whom It May Concern:    Victor Manuel Sales was seen in my clinic on 7/26/2024 at 9:20 am. Please excuse Victor Manuel for his absence from work on this day to make the appointment.  Due to patient's medical conditions at this time she will need to be off work for approximately 2 to 3 weeks.  He will be following up with his primary care physician Dr. Real at that time to reassess and determine his appropriateness for return to work.    If you have any questions or concerns, please don't hesitate to call.         Sincerely,         Latrell Arce DO        CC: No Recipients

## 2024-07-26 NOTE — LETTER
July 26, 2024     Patient: Victor Manuel Sales   YOB: 1965   Date of Visit: 7/26/2024       To Whom It May Concern:    Victor Manuel Sales was seen in my clinic on 7/26/2024 at 9:20 am. Please excuse Victor Manuel for his absence from work on this day to make the appointment. Due to patients medical condition he will need to be out of work for next 2 weeks and will be following up with Dr. Real at that time to reassess.     If you have any questions or concerns, please don't hesitate to call.         Sincerely,         Latrell Arce DO        CC: No Recipients

## 2024-07-26 NOTE — PROGRESS NOTES
Subjective   Victor Manuel Sales is a 58 y.o. male who presents for Ankle Pain (Dr Real patient /Left ankle pain x 3-4 weeks).  Patient here to follow-up for grade 2 ankle sprain of the left ankle.  His last seen by Dr. Real on 7/1/2024.  Since then he has been wearing a walking boot when he is home from work.  He was instructed to use an ankle brace while at work and to go to physical therapy, however he has not been able to do either of these things.  The ankle brace was too uncomfortable and he could not get off work to go to physical therapy.  He has been taking approximately 400 mg of ibuprofen every 4-6 hours which helps somewhat.  The ankle is less swollen and the bruising has resolved.  He does feel like the pain is still present especially after a long day at work       Visit Vitals  /83 (BP Location: Right arm, Patient Position: Sitting)   Pulse 70   Temp 36.4 °C (97.5 °F)   Resp 18      Objective   Physical Exam  Vitals reviewed.   Constitutional:       General: He is not in acute distress.     Appearance: Normal appearance. He is not toxic-appearing.   HENT:      Head: Normocephalic and atraumatic.      Nose: Nose normal.   Eyes:      Extraocular Movements: Extraocular movements intact.   Cardiovascular:      Rate and Rhythm: Normal rate.   Pulmonary:      Effort: Pulmonary effort is normal. No respiratory distress.   Skin:     General: Skin is warm and dry.   Neurological:      General: No focal deficit present.      Mental Status: He is alert.   Psychiatric:         Mood and Affect: Mood normal.         Behavior: Behavior normal.            Assessment/Plan      Assessment & Plan  Grade 2 ankle sprain  Patient here for follow-up for grade 2 ankle sprain of left ankle.  He has not been able to receive physical therapy and has had trouble resting his ankle due to the laborious nature of his job.  Advised patient that she needs to take some time off from work note was provided for work excuse for  "the next 2 to 3 weeks at that time he will be following up with his PCP Dr. Real.  I have also provided a prescription for naproxen 500 mg twice daily as needed for the pain.  Patient also try to wear his ankle brace when he was walking and can stop wearing his ankle boot at this time.  Have also provided him a new formal referral to physical therapy and provided home physical therapy exercises.  Orders:    Referral to Physical Therapy; Future    naproxen (Naprosyn) 500 mg tablet; Take 1 tablet (500 mg) by mouth 2 times a day as needed for mild pain (1 - 3) or moderate pain (4 - 6) (pain) for up to 20 days.           This note was partially created using voice recognition software and is inherently subject to errors including those of syntax and \"sound-alike\" substitutions which may escape proofreading. In such instances, original meaning may be extrapolated by contextual derivation.      "

## 2024-07-26 NOTE — ASSESSMENT & PLAN NOTE
Patient here for follow-up for grade 2 ankle sprain of left ankle.  He has not been able to receive physical therapy and has had trouble resting his ankle due to the laborious nature of his job.  Advised patient that she needs to take some time off from work note was provided for work excuse for the next 2 to 3 weeks at that time he will be following up with his PCP Dr. Real.  I have also provided a prescription for naproxen 500 mg twice daily as needed for the pain.  Patient also try to wear his ankle brace when he was walking and can stop wearing his ankle boot at this time.  Have also provided him a new formal referral to physical therapy and provided home physical therapy exercises.  Orders:    Referral to Physical Therapy; Future    naproxen (Naprosyn) 500 mg tablet; Take 1 tablet (500 mg) by mouth 2 times a day as needed for mild pain (1 - 3) or moderate pain (4 - 6) (pain) for up to 20 days.

## 2024-08-02 ENCOUNTER — TELEPHONE (OUTPATIENT)
Dept: PRIMARY CARE | Facility: CLINIC | Age: 59
End: 2024-08-02
Payer: COMMERCIAL

## 2024-08-02 NOTE — TELEPHONE ENCOUNTER
Patient called both Thursday evening (before I left) 08/01/24, and this morning Friday 08/02/24 in regards to paperwork from [employer]. We did receive this yesterday via fax, and I did give this to you.     Just giving you a heads up. Thanks.

## 2024-08-08 NOTE — PROGRESS NOTES
"Physical Therapy    Physical Therapy Evaluation and Treatment      Patient Name: Victor Manuel Sales \"Alex\"  MRN: [id number]  Today's Date: 8/9/2024    Time Entry:   Time Calculation  Start Time: 0800  Stop Time: 0840  Time Calculation (min): 40 min  PT Evaluation Time Entry  PT Evaluation (Low) Time Entry: 20  PT Therapeutic Procedures Time Entry  Therapeutic Exercise Time Entry: 20                   Assessment:  PT Assessment  PT Assessment Results: Decreased strength, Decreased range of motion, Decreased endurance, Impaired balance, Decreased mobility, Decreased coordination, Pain  Rehab Prognosis: Good    Low complexity, stable     Patient is a 58 year old male that presents to physical therapy evaluation today due to complaints of L ankle pain. Pt injured the ankle by hitting it on the edge of a chair and rolling it. Patient demonstrates a decline in their functional mobility secondary to pain, decreased ankle ROM, flexibility deficits of gastroc/soleus complex, strength deficits in foot/ankle musculature, and motor control deficits including SL stance and gait. Due to these impairments, the patients has the following functional limitations: pain with standing,  difficulty walking all distances, squatting, ascending/descending stairs, performing all household chores and participating in all leisure activities. Pt will benefit from continued skilled therapy to address their impairments and progress towards the associated functional goals.      Plan:  OP PT Plan  Treatment/Interventions: Aquatic therapy, Biofeedback, Blood flow restriction therapy, Cryotherapy, Dry needling, Education/ Instruction, Electrical stimulation, Gait training, Hot pack, Manual therapy, Neuromuscular re-education, Self care/ home management, Taping techniques, Therapeutic activities, Therapeutic exercises, Ultrasound, Vasopneumatic device    Plan to continue to work on progressing towards established rehab goals as per patient tolerance. " "      Current Problem:   1. Grade 2 ankle sprain  Referral to Physical Therapy    Follow Up In Physical Therapy      2. Acute left ankle pain  Follow Up In Physical Therapy      3. Ankle weakness  Follow Up In Physical Therapy          Subjective      Pt reports he hit his ankle on a chair back about a month or so ago. He felt like he hit right below the lateral malleolus and had pain and swelling.   He went to urgent care and got x-rays and there was no fracture found. They gave him a boot and he tried to go back to work but he couldn't do it as it hurt too much to walk and he walks a lot at work.     Pt works as a  at [employer].     Pt feels like it has gotten better with time but his primary recommended some PT to help.     He feels like the burning in the ankle sometime feels like tingling.     Pain:     Location: LT ANKLE under malleolus   Description: burns and very sore   Worst: 7/10  Best: 2/10  Current: 4/10  Aggravating Factors:  standing, walking  Relieving Factors:  ice, elevating, OTC medication     Pt goals: \" reduce pain and get back to all activities\"    Relevant Information (PMH & Previous Tests/Imaging):     PMH:   Headaches, migraines       X-ray ankle:   IMPRESSION:  Left ankle films demonstrate no presence for acute fracture or  dislocation. Normal anatomic spacing and alignment noted. Ankle joint  mortise intact and preserved. Lateral soft tissue swelling present.        Previous Interventions/Treatments: n/a    Red Flags: Do you have any of the following? NO  Fever/chills, unexplained weight changes, dizziness/fainting, unexplained change in bowel or bladder functions, unexplained malaise or muscle weakness, night pain/sweats, numbness or tingling    General:  General  Reason for Referral: LT ANKLE  Referred By: Dr. Dylan DO  Precautions:  N/a     Pain:  Pain Assessment  Pain Assessment: 0-10  0-10 (Numeric) Pain Score: 4  Pain Type: Acute pain  Pain Location: Ankle  Pain Orientation: " "Left  Pain Radiating Towards: lateral  Pain Descriptors: Sore  Home Living:   Pt lives with his wife in a multi-story home, hand rail present   Prior Level of Function:   Pt independent with all ADLs    Objective     Foot/Ankle Musculoskeletal Exam  Gait    Antalgic: left    Inspection    Left      Edema: mild      Palpation    Left       Increased warmth: mild        Tenderness: present          ATFL: moderate          CFL: moderate          Peroneal tendon: mild      Range of Motion    Left      Active Dorsiflexion: 0      Active Plantar Flexion: 30    Strength    Left      Tibialis anterior: 5/5.       Gastroc/soleus: 4+/5.       Tibialis posterior: 4/5.       Peroneals: 4/5. Peroneals are affected by pain.     Special Tests    Left      Single heel rise: positive and with pain        Double heel rise: positive and with pain        Outcome Measures:  Other Measures  Lower Extremity Funtional Score (LEFS): 38/80     Treatments:  Therapeutic Exercise  Therapeutic Exercise Performed: Yes  Therapeutic Exercise Activity 1: seated pravin curls x30  Therapeutic Exercise Activity 2: seated windshield wipers x30  Therapeutic Exercise Activity 3: standing gastroc stretch 3x20\"  Therapeutic Exercise Activity 4: resisted 3 way ankle 3x10 red    EDUCATION:  Outpatient Education  Individual(s) Educated: Patient  Education Provided: Body Mechanics, Anatomy, Home Exercise Program, POC  Risk and Benefits Discussed with Patient/Caregiver/Other: yes  Patient/Caregiver Demonstrated Understanding: yes  Plan of Care Discussed and Agreed Upon: yes  Patient Response to Education: Patient/Caregiver Verbalized Understanding of Information    The patient was educated on: the importance of positioning, proper posture, and body mechanics, joint mechanics and pathology, general tissue healing time, the appropriate use of heat and cold to control pain and inflammation, the importance of general therapeutic exercise, especially to stay within " pain-free ROM, specific anatomy, function, & regional interdependence of involved areas, & likely cause of impairments & POC. Pt's questions were answered to their satisfaction, & pt. verbalized understanding & agreement with POC    Access Code: [email or web address]  URL: [email or web address]  Date: 08/09/2024  Prepared by: Alix Orozco    Goals:  Balance: Pt will demonstrate SL Balance time length = to uninvolved side.   Gait/Locomotion: Pt will demonstrate uncompensated and pain free ambulation and stair negotiation.  Pain: Pt will report 0/10 pain at rest, and <2/10 pain with activity.  Participation Restrictions: Pt will resume full participation in exercise activites with no reports of ankle pain.  Range Of Motion/Joint Mobility: Pt will demonstrate L ankle AROM/PROM within 5 deg of contralateral side  Strength: Pt will demonstrate ability to perform SL calf raises to fatigue = to uninvolved side.  Pt will report LEFS score of >/= 65/80.

## 2024-08-09 ENCOUNTER — APPOINTMENT (OUTPATIENT)
Dept: PHYSICAL THERAPY | Facility: CLINIC | Age: 59
End: 2024-08-09
Payer: COMMERCIAL

## 2024-08-09 DIAGNOSIS — R29.898 ANKLE WEAKNESS: ICD-10-CM

## 2024-08-09 DIAGNOSIS — S93.409A GRADE 2 ANKLE SPRAIN: Primary | ICD-10-CM

## 2024-08-09 DIAGNOSIS — M25.572 ACUTE LEFT ANKLE PAIN: ICD-10-CM

## 2024-08-09 ASSESSMENT — PAIN DESCRIPTION - DESCRIPTORS: DESCRIPTORS: SORE

## 2024-08-09 ASSESSMENT — PAIN SCALES - GENERAL: PAINLEVEL_OUTOF10: 4

## 2024-08-09 ASSESSMENT — PAIN - FUNCTIONAL ASSESSMENT: PAIN_FUNCTIONAL_ASSESSMENT: 0-10

## 2024-08-09 ASSESSMENT — ENCOUNTER SYMPTOMS
LOSS OF SENSATION IN FEET: 0
OCCASIONAL FEELINGS OF UNSTEADINESS: 0
DEPRESSION: 0

## 2024-08-09 NOTE — PATIENT INSTRUCTIONS
Access Code: [email or web address]  URL: [email or web address]  Date: 08/09/2024  Prepared by: Alix Orozco    Exercises  - Towel Scrunches  - 2 x daily - 7 x weekly - 3 sets - 10 reps  - Ankle Inversion Eversion Towel Slide  - 2 x daily - 7 x weekly - 3 sets - 10 reps  - Gastroc Stretch on Wall  - 2 x daily - 7 x weekly - 3 sets - 20 hold  - Long Sitting Ankle Inversion with Resistance  - 1 x daily - 7 x weekly - 3 sets - 10 reps - 1 hold  - Long Sitting Ankle Eversion with Resistance  - 1 x daily - 7 x weekly - 3 sets - 10 reps - 1 hold  - Long Sitting Ankle Dorsiflexion with Anchored Resistance  - 1 x daily - 7 x weekly - 3 sets - 10 reps - 1 hold

## 2024-08-12 ENCOUNTER — APPOINTMENT (OUTPATIENT)
Dept: PRIMARY CARE | Facility: CLINIC | Age: 59
End: 2024-08-12
Payer: COMMERCIAL

## 2024-08-12 VITALS
DIASTOLIC BLOOD PRESSURE: 70 MMHG | SYSTOLIC BLOOD PRESSURE: 104 MMHG | TEMPERATURE: 98.9 F | HEART RATE: 81 BPM | BODY MASS INDEX: 28.98 KG/M2 | WEIGHT: 207 LBS | HEIGHT: 71 IN | RESPIRATION RATE: 18 BRPM | OXYGEN SATURATION: 98 %

## 2024-08-12 DIAGNOSIS — S93.409A GRADE 2 ANKLE SPRAIN: ICD-10-CM

## 2024-08-12 PROBLEM — R25.2 SPASM: Status: ACTIVE | Noted: 2024-08-12

## 2024-08-12 PROBLEM — J32.9 SINUSITIS: Status: ACTIVE | Noted: 2024-08-12

## 2024-08-12 PROBLEM — S39.91XA INJURY OF GROIN: Status: ACTIVE | Noted: 2024-08-12

## 2024-08-12 PROCEDURE — 99213 OFFICE O/P EST LOW 20 MIN: CPT | Performed by: FAMILY MEDICINE

## 2024-08-12 PROCEDURE — 3008F BODY MASS INDEX DOCD: CPT | Performed by: FAMILY MEDICINE

## 2024-08-12 PROCEDURE — 1036F TOBACCO NON-USER: CPT | Performed by: FAMILY MEDICINE

## 2024-08-12 RX ORDER — NAPROXEN 500 MG/1
500 TABLET ORAL 2 TIMES DAILY PRN
Qty: 20 TABLET | Refills: 1 | Status: SHIPPED | OUTPATIENT
Start: 2024-08-12 | End: 2024-09-01

## 2024-08-12 ASSESSMENT — ENCOUNTER SYMPTOMS
WOUND: 0
FEVER: 0
COLOR CHANGE: 0
DIZZINESS: 0
JOINT SWELLING: 1
BRUISES/BLEEDS EASILY: 0
MYALGIAS: 1
FATIGUE: 0
ARTHRALGIAS: 1

## 2024-08-12 NOTE — PATIENT INSTRUCTIONS

## 2024-08-12 NOTE — PROGRESS NOTES
"Subjective   Patient ID: Victor Manuel Sales \"[place]" is a 58 y.o. male who presents for Follow-up.  HPI    Pt presents follow up left ankle injury. He went to physical therapy on Friday  (8/9) and reports improvement of symptoms. He states he would like to return to work tomorrow (8/13). He describes his pain as a 5/10 today. Pt was given bands and continues to do PT at home with continued reduction of symptoms.    Review of Systems   Constitutional:  Negative for fatigue and fever.   Cardiovascular:  Negative for leg swelling.   Musculoskeletal:  Positive for arthralgias, joint swelling and myalgias. Negative for gait problem.   Skin:  Negative for color change, rash and wound.   Neurological:  Negative for dizziness.   Hematological:  Does not bruise/bleed easily.         Objective   Physical Exam  Vitals: I have reviewed the vitals  General: Well-developed.  In no acute distress.  Eyes:   sclera nonicteric.  Conjunctiva not injected.  No discharge.   HEAD: Normocephalic, atraumatic.  HEENT   Mucous membranes moist.  Posterior oropharynx nonerythematous, no tonsillar exudates.      No cervical lymphadenopathy.  Cardio: Regular rate and rhythm.  No murmur, rub or gallop.  Pulmonary: Lungs clear to auscultation in all fields.  No accessory muscle use.  GI/: Normal active bowel sounds.  Soft, nontender.  No masses or organomegaly appreciated.  Musculoskeletal: No gross deformities appreciated. Slight left ankle edema without ecchymosis or erythema  Neuro: Alert, age-appropriate.  Normal muscle tone.  Moving all extremities.  Skin: No rash, bruises or lesions.    Labs         RXR ankle left 3+ views  Status: Final result     PACS Images     Show images for XR ankle left 3+ views  Signed by    Signed Time Phone Pager   Cole C Budinsky, MD 6/25/2024 18:47 [phone]      Exam Information    Status Exam Begun Exam Ended   Final 6/25/2024 11:02 6/25/2024 11:06     Study Result    Narrative & Impression   Interpreted By:  " Budinsky, Cole,   STUDY:  XR ANKLE LEFT 3+ VIEWS; ;  6/25/2024 11:06 am      INDICATION:  Signs/Symptoms:pain.      ACCESSION NUMBER(S):  [id number]      ORDERING CLINICIAN:  COLE BUDINSKY      IMPRESSION:  Left ankle films demonstrate no presence for acute fracture or  dislocation. Normal anatomic spacing and alignment noted. Ankle joint  mortise intact and preserved. Lateral soft tissue swelling present.          Signed by: Cole Budinsky 6/25/2024 6:47 PM  Dictation workstation:   [id number]   eviewed PT notes.   Assessment/Plan   Problem List Items Addressed This Visit       Grade 2 ankle sprain     Patient instructed to continue outpatient PT recommendations of physical therapist and precautions regarding fall risk discussed with best outcome with use of brace as per physical therapy return if worsening or increasing pain otherwise follow-up from maintenance issues

## 2024-08-12 NOTE — ASSESSMENT & PLAN NOTE
Patient instructed to continue outpatient PT recommendations of physical therapist and precautions regarding fall risk discussed with best outcome with use of brace as per physical therapy return if worsening or increasing pain otherwise follow-up from maintenance issues

## 2024-08-26 ENCOUNTER — APPOINTMENT (OUTPATIENT)
Dept: PHYSICAL THERAPY | Facility: CLINIC | Age: 59
End: 2024-08-26
Payer: COMMERCIAL

## 2024-08-26 DIAGNOSIS — R29.898 ANKLE WEAKNESS: ICD-10-CM

## 2024-08-26 DIAGNOSIS — M25.572 ACUTE LEFT ANKLE PAIN: ICD-10-CM

## 2024-08-26 DIAGNOSIS — S93.409A GRADE 2 ANKLE SPRAIN: ICD-10-CM

## 2024-08-26 PROCEDURE — 97110 THERAPEUTIC EXERCISES: CPT | Performed by: PHYSICAL THERAPIST

## 2024-08-26 ASSESSMENT — PAIN DESCRIPTION - DESCRIPTORS: DESCRIPTORS: SORE

## 2024-08-26 ASSESSMENT — PAIN - FUNCTIONAL ASSESSMENT: PAIN_FUNCTIONAL_ASSESSMENT: 0-10

## 2024-08-26 ASSESSMENT — PAIN SCALES - GENERAL: PAINLEVEL_OUTOF10: 4

## 2024-08-26 NOTE — PROGRESS NOTES
"Physical Therapy    Physical Therapy Treatment    Patient Name: Victor Manuel Sales  MRN: [id number]  Today's Date: 8/26/2024    Time Entry:   Time Calculation  Start Time: 0440  Stop Time: 0525  Time Calculation (min): 45 min     PT Therapeutic Procedures Time Entry  Therapeutic Exercise Time Entry: 45                   Assessment:   Pt reporting some soreness in the ankle to begin. He has been back to work and on his feet more but it is improving with time. Added in multiple new exercises this date and he tolerated well. Able to ascend and descend 8\" step today without increased pain. Provided him an updated handout and reviewed in depth.     Plan:   Plan to continue to work on progressing towards established rehab goals as per patient tolerance.       Current Problem  1. Grade 2 ankle sprain  Follow Up In Physical Therapy      2. Acute left ankle pain  Follow Up In Physical Therapy      3. Ankle weakness  Follow Up In Physical Therapy          General  PT  Visit  PT Received On: 08/26/24  General  Reason for Referral: LT ANKLE  Referred By: Dr. Real DO    Insurance  [employer]   Visit: 2    Subjective      Pt reports he has been doing better overall. He states he did end up going back to work which did make him a little more sore in the ankle but he tolerated our exercises from his eval well.     Precautions  N/a     Pain  Pain Assessment  Pain Assessment: 0-10  0-10 (Numeric) Pain Score: 4  Pain Type: Acute pain  Pain Location: Ankle  Pain Orientation: Left  Pain Radiating Towards: lateral  Pain Descriptors: Sore    Objective       Some difficulty with seated ankle tilt board       Treatments:  Therapeutic Exercise  Therapeutic Exercise Performed: Yes  Therapeutic Exercise Activity 1: recumbent bike 5 min level 3  Therapeutic Exercise Activity 2: seated towel curls 3x10  Therapeutic Exercise Activity 3: seated windshield wipers 3x10  Therapeutic Exercise Activity 4: seated ankle tilt board all directions " "x30  Therapeutic Exercise Activity 5: DL heel raise/toe raise 2x10 1/2 foam  Therapeutic Exercise Activity 6: SL balance blue foam x5 10\"  Therapeutic Exercise Activity 7: step up front 2x10 8\" balance at top  Therapeutic Exercise Activity 8: step up lat 2x10 8\" balance at top  Therapeutic Exercise Activity 9: step overs x10 8\"    OP EDUCATION:  The patient was educated on: the importance of positioning, proper posture, and body mechanics, joint mechanics and pathology, general tissue healing time, the appropriate use of heat and cold to control pain and inflammation, the importance of general therapeutic exercise, especially to stay within pain-free ROM, specific anatomy, function, & regional interdependence of involved areas, & likely cause of impairments & POC. Pt's questions were answered to their satisfaction, & pt. verbalized understanding & agreement with POC    Access Code: [email or web address]  URL: [email or web address]  Date: 08/09/2024  Prepared by: Alix Orozco    Goals:  Balance: Pt will demonstrate SL Balance time length = to uninvolved side.   Gait/Locomotion: Pt will demonstrate uncompensated and pain free ambulation and stair negotiation.  Pain: Pt will report 0/10 pain at rest, and <2/10 pain with activity.  Participation Restrictions: Pt will resume full participation in exercise activites with no reports of ankle pain.  Range Of Motion/Joint Mobility: Pt will demonstrate L ankle AROM/PROM within 5 deg of contralateral side  Strength: Pt will demonstrate ability to perform SL calf raises to fatigue = to uninvolved side.  Pt will report LEFS score of >/= 65/80.    "

## 2024-09-09 ENCOUNTER — APPOINTMENT (OUTPATIENT)
Dept: PHYSICAL THERAPY | Facility: CLINIC | Age: 59
End: 2024-09-09
Payer: COMMERCIAL

## 2024-09-16 ENCOUNTER — APPOINTMENT (OUTPATIENT)
Dept: PHYSICAL THERAPY | Facility: CLINIC | Age: 59
End: 2024-09-16
Payer: COMMERCIAL

## 2024-10-07 ENCOUNTER — APPOINTMENT (OUTPATIENT)
Dept: PHYSICAL THERAPY | Facility: CLINIC | Age: 59
End: 2024-10-07
Payer: COMMERCIAL

## 2024-10-07 DIAGNOSIS — R29.898 ANKLE WEAKNESS: ICD-10-CM

## 2024-10-07 DIAGNOSIS — S93.409A GRADE 2 ANKLE SPRAIN: ICD-10-CM

## 2024-10-07 DIAGNOSIS — M25.572 ACUTE LEFT ANKLE PAIN: ICD-10-CM

## 2024-10-07 PROCEDURE — 97110 THERAPEUTIC EXERCISES: CPT | Performed by: PHYSICAL THERAPIST

## 2024-10-07 PROCEDURE — 97140 MANUAL THERAPY 1/> REGIONS: CPT | Performed by: PHYSICAL THERAPIST

## 2024-10-07 ASSESSMENT — PAIN SCALES - GENERAL: PAINLEVEL_OUTOF10: 7

## 2024-10-07 ASSESSMENT — PAIN DESCRIPTION - DESCRIPTORS: DESCRIPTORS: SORE

## 2024-10-07 ASSESSMENT — PAIN - FUNCTIONAL ASSESSMENT: PAIN_FUNCTIONAL_ASSESSMENT: 0-10

## 2024-10-07 NOTE — PATIENT INSTRUCTIONS
Access Code: [email or web address]  URL: [email or web address]  Date: 10/07/2024  Prepared by: Alix Orozco    Exercises  - Towel Scrunches  - 2 x daily - 7 x weekly - 3 sets - 10 reps  - Ankle Inversion Eversion Towel Slide  - 2 x daily - 7 x weekly - 3 sets - 10 reps  - Gastroc Stretch on Wall  - 2 x daily - 7 x weekly - 3 sets - 20 hold  - Long Sitting Ankle Inversion with Resistance  - 1 x daily - 7 x weekly - 3 sets - 10 reps - 1 hold  - Long Sitting Ankle Eversion with Resistance  - 1 x daily - 7 x weekly - 3 sets - 10 reps - 1 hold  - Long Sitting Ankle Dorsiflexion with Anchored Resistance  - 1 x daily - 7 x weekly - 3 sets - 10 reps - 1 hold  - Heel Raises with Counter Support  - 1 x daily - 7 x weekly - 2 sets - 10 reps - 1 hold  - Toe Raises with Counter Support  - 1 x daily - 7 x weekly - 2 sets - 10 reps - 1 hold  - Single Leg Stance with Support  - 1 x daily - 7 x weekly - 5 sets - 10 hold  - Supine Peroneal Nerve Glide  - 1 x daily - 7 x weekly - 3 sets - 10 reps - 1 hold  - Soleus Stretch on Wall  - 1 x daily - 7 x weekly - 3 sets - 20 hold  - Single Leg Stance with Support  - 1 x daily - 7 x weekly - 3 sets - 20 hold

## 2024-10-08 ENCOUNTER — APPOINTMENT (OUTPATIENT)
Dept: PRIMARY CARE | Facility: CLINIC | Age: 59
End: 2024-10-08
Payer: COMMERCIAL

## 2024-10-08 VITALS
OXYGEN SATURATION: 96 % | HEART RATE: 82 BPM | WEIGHT: 207 LBS | SYSTOLIC BLOOD PRESSURE: 110 MMHG | DIASTOLIC BLOOD PRESSURE: 74 MMHG | BODY MASS INDEX: 28.98 KG/M2 | RESPIRATION RATE: 18 BRPM | HEIGHT: 71 IN | TEMPERATURE: 97 F

## 2024-10-08 DIAGNOSIS — G89.29 CHRONIC PAIN OF LEFT ANKLE: ICD-10-CM

## 2024-10-08 DIAGNOSIS — S93.409A GRADE 2 ANKLE SPRAIN: Primary | ICD-10-CM

## 2024-10-08 DIAGNOSIS — M25.572 CHRONIC PAIN OF LEFT ANKLE: ICD-10-CM

## 2024-10-08 PROCEDURE — 99213 OFFICE O/P EST LOW 20 MIN: CPT | Performed by: FAMILY MEDICINE

## 2024-10-08 PROCEDURE — 1036F TOBACCO NON-USER: CPT | Performed by: FAMILY MEDICINE

## 2024-10-08 PROCEDURE — 3008F BODY MASS INDEX DOCD: CPT | Performed by: FAMILY MEDICINE

## 2024-10-08 RX ORDER — MELOXICAM 15 MG/1
15 TABLET ORAL DAILY
Qty: 90 TABLET | Refills: 0 | Status: SHIPPED | OUTPATIENT
Start: 2024-10-08

## 2024-10-08 RX ORDER — MELOXICAM 15 MG/1
15 TABLET ORAL DAILY
COMMUNITY
End: 2024-10-08 | Stop reason: SDUPTHER

## 2024-10-08 ASSESSMENT — ENCOUNTER SYMPTOMS
ARTHRALGIAS: 1
NUMBNESS: 0

## 2024-10-08 NOTE — PATIENT INSTRUCTIONS

## 2024-10-08 NOTE — PROGRESS NOTES
"Subjective   Patient ID: Victor Manuel Sales \"Alex\" is a 58 y.o. male who presents for left ankle pain.  HPI  Going to  pt ...  Pain   is        sometimes  9/10  Wants refill  on  meloxicam  Awakens  at  nightime   Reviewed orthopedic note dated 6/25   Review of Systems   Musculoskeletal:  Positive for arthralgias.   Neurological:  Negative for numbness.       Objective   Physical Exam  general: alert oriented x three  HEENT hearing normal to voice  Neck supple  Lungs respirations non-labored.  Cardiovascular: no peripheral edema  Skin: warm and dry without rash  Psych: judgement and insight normal  Musculoskeletal:  ambulation normal,    left  marilynn  with tenderness  over  lateral  malleolus  and inferior      Labs    Narrative & Impression   Interpreted By:  Budinsky, Cole,   STUDY:  XR ANKLE LEFT 3+ VIEWS; ;  6/25/2024 11:06 am      IMPRESSION:  Left ankle films demonstrate no presence for acute fracture or  dislocation. Normal anatomic spacing and alignment noted. Ankle joint  mortise intact and preserved. Lateral soft tissue swelling present.           Assessment/Plan   Problem List Items Addressed This Visit       Ankle pain    Grade 2 ankle sprain - Primary      patient already seeing PT.  This is now 4 months postinjury.  Will get in with orthopedic for question MRI question surgical intervention Going to  pt ...  Pain   is        sometimes  9/10  Wants refill  on  meloxicam  Awakens  at  nightime   Reviewed orthopedic note dated 6/25  to  balance podiatry                       "

## 2024-10-08 NOTE — ASSESSMENT & PLAN NOTE
patient already seeing PT.  This is now 4 months postinjury.  Will get in with orthopedic for question MRI question surgical intervention Going to  pt ...  Pain   is        sometimes  9/10  Wants refill  on  meloxicam  Awakens  at  nightime   Reviewed orthopedic note dated 6/25  to  balance podiatry

## 2024-10-14 ENCOUNTER — APPOINTMENT (OUTPATIENT)
Dept: PHYSICAL THERAPY | Facility: CLINIC | Age: 59
End: 2024-10-14
Payer: COMMERCIAL

## 2024-10-14 DIAGNOSIS — S93.409A GRADE 2 ANKLE SPRAIN: ICD-10-CM

## 2024-10-14 DIAGNOSIS — M25.572 ACUTE LEFT ANKLE PAIN: ICD-10-CM

## 2024-10-14 DIAGNOSIS — R29.898 ANKLE WEAKNESS: ICD-10-CM

## 2024-10-14 PROCEDURE — 97140 MANUAL THERAPY 1/> REGIONS: CPT | Performed by: PHYSICAL THERAPIST

## 2024-10-14 PROCEDURE — 97110 THERAPEUTIC EXERCISES: CPT | Performed by: PHYSICAL THERAPIST

## 2024-10-14 ASSESSMENT — PAIN DESCRIPTION - DESCRIPTORS: DESCRIPTORS: SORE

## 2024-10-14 ASSESSMENT — PAIN - FUNCTIONAL ASSESSMENT: PAIN_FUNCTIONAL_ASSESSMENT: 0-10

## 2024-10-14 ASSESSMENT — PAIN SCALES - GENERAL: PAINLEVEL_OUTOF10: 5 - MODERATE PAIN

## 2024-10-14 NOTE — PROGRESS NOTES
Physical Therapy    Physical Therapy Treatment    Patient Name: Victor Manuel Sales  MRN: [id number]  Today's Date: 10/14/2024    Time Entry:   Time Calculation  Start Time: 0712  Stop Time: 0755  Time Calculation (min): 43 min     PT Therapeutic Procedures Time Entry  Manual Therapy Time Entry: 25  Therapeutic Exercise Time Entry: 18                   Assessment:   Pt reporting his ankle continues to be sore, pointing to the lateral ankle. He also gets some burning towards the end of the day.   Dry needling performed this date to sural nerve distrubution and peroneal nerves.   All needles removed, area inspected for adverse symptoms, none noted, patient educated in post- dry needling management and expected symptoms from treatment. All patient questions answered. Will re-assess at next appt and progress as able.  Introduced some gentle peroneal nerve glides into his HEP and provided him a new handout.     Plan:   Plan to continue to work on progressing towards established rehab goals as per patient tolerance.       Current Problem  1. Grade 2 ankle sprain  Follow Up In Physical Therapy      2. Acute left ankle pain  Follow Up In Physical Therapy      3. Ankle weakness  Follow Up In Physical Therapy          General  PT  Visit  PT Received On: 10/14/24  General  Reason for Referral: LT ANKLE  Referred By: Dr. Real DO    [facility]   Visit: 4    Subjective      Pt reports he was sore after last session right in the afternoon. He continues to feel more so the burning in the lateral ankle. He feels like it gets worse towards the end of the day.   He still does note issues with steps.       He did have a follow up with his primary care doctor. He referred him to podiatry, he does have that appointment scheduled.     Precautions  N/a     Pain  Pain Assessment  Pain Assessment: 0-10  0-10 (Numeric) Pain Score: 5 - Moderate pain  Pain Type: Acute pain  Pain Location: Ankle  Pain Orientation: Left  Pain Radiating  "Towards: lateral  Pain Descriptors: Sore    Objective     Still some lateral ankle swelling, no worse from last session but no better     Treatments:  Therapeutic Exercise  Therapeutic Exercise Performed: Yes  Therapeutic Exercise Activity 1: recumbent bike 5 min level 3  Therapeutic Exercise Activity 2: standing ankle tilt board 3x10 all directions  Therapeutic Exercise Activity 3: DL heel raise / toe raise 1/2 foam 3x10  Therapeutic Exercise Activity 4: step up front and lat 2x10 8\"    Manual Therapy  Manual Therapy Performed: Yes  Manual Therapy Activity 1: dry needling 0.58x72ls sural nerve, peronals (3), lateral calcaneus (3), with e stim for 5 min  Manual Therapy Activity 2: IASTM gastroc/lateral ankle    OP EDUCATION:    The patient was educated on: the importance of positioning, proper posture, and body mechanics, joint mechanics and pathology, general tissue healing time, the appropriate use of heat and cold to control pain and inflammation, the importance of general therapeutic exercise, especially to stay within pain-free ROM, specific anatomy, function, & regional interdependence of involved areas, & likely cause of impairments & POC. Pt's questions were answered to their satisfaction, & pt. verbalized understanding & agreement with POC    Access Code: [email or web address]  URL: [email or web address]  Date: 08/09/2024  Prepared by: Alix Orozco    Goals:  Balance: Pt will demonstrate SL Balance time length = to uninvolved side.   Gait/Locomotion: Pt will demonstrate uncompensated and pain free ambulation and stair negotiation.  Pain: Pt will report 0/10 pain at rest, and <2/10 pain with activity.  Participation Restrictions: Pt will resume full participation in exercise activites with no reports of ankle pain.  Range Of Motion/Joint Mobility: Pt will demonstrate L ankle AROM/PROM within 5 deg of contralateral side  Strength: Pt will demonstrate ability to perform SL calf raises to fatigue = " to uninvolved side.  Pt will report LEFS score of >/= 65/80.

## 2024-10-15 ENCOUNTER — TELEPHONE (OUTPATIENT)
Dept: PRIMARY CARE | Facility: CLINIC | Age: 59
End: 2024-10-15
Payer: COMMERCIAL

## 2024-10-15 NOTE — TELEPHONE ENCOUNTER
Called pt and reviewed his FMLA.  He plans for his wife to  today  I have put at  in file   A copy was placed to scan to chart

## 2024-10-21 ENCOUNTER — APPOINTMENT (OUTPATIENT)
Dept: PHYSICAL THERAPY | Facility: CLINIC | Age: 59
End: 2024-10-21
Payer: COMMERCIAL

## 2024-10-21 DIAGNOSIS — M25.572 ACUTE LEFT ANKLE PAIN: ICD-10-CM

## 2024-10-21 DIAGNOSIS — R29.898 ANKLE WEAKNESS: ICD-10-CM

## 2024-10-21 DIAGNOSIS — S93.409D: Primary | ICD-10-CM

## 2024-10-21 DIAGNOSIS — S93.409A GRADE 2 ANKLE SPRAIN: ICD-10-CM

## 2024-10-21 PROCEDURE — 97110 THERAPEUTIC EXERCISES: CPT | Performed by: PHYSICAL THERAPIST

## 2024-10-21 PROCEDURE — 97140 MANUAL THERAPY 1/> REGIONS: CPT | Performed by: PHYSICAL THERAPIST

## 2024-10-21 ASSESSMENT — PAIN - FUNCTIONAL ASSESSMENT: PAIN_FUNCTIONAL_ASSESSMENT: 0-10

## 2024-10-21 ASSESSMENT — PAIN SCALES - GENERAL: PAINLEVEL_OUTOF10: 3

## 2024-10-21 ASSESSMENT — PAIN DESCRIPTION - DESCRIPTORS: DESCRIPTORS: SORE

## 2024-10-21 NOTE — PROGRESS NOTES
Physical Therapy    Physical Therapy Treatment    Patient Name: Victor Manuel Sales  MRN: [id number]  Today's Date: 10/21/2024    Time Entry:   Time Calculation  Start Time: 0930  Stop Time: 1015  Time Calculation (min): 45 min     PT Therapeutic Procedures Time Entry  Manual Therapy Time Entry: 25  Therapeutic Exercise Time Entry: 20                   Assessment:  PT Assessment  PT Assessment Results: Decreased strength, Decreased range of motion, Decreased endurance, Decreased mobility, Pain  Rehab Prognosis: Good    Patient is a 58 year old male that presents to physical therapy re-evaluation today due to complaints of L ankle pain. Pt injured the ankle by hitting it on the edge of a chair and rolling it. Patient demonstrates a decline in their functional mobility secondary to pain, decreased ankle ROM, flexibility deficits of gastroc/soleus complex, strength deficits in foot/ankle musculature, and motor control deficits including SL stance and gait. Due to these impairments, the patients has the following functional limitations: pain with standing,  difficulty walking all distances, squatting, ascending/descending stairs, performing all household chores and participating in all leisure activities. Pt will benefit from continued skilled therapy to address their impairments and progress towards the associated functional goals.    Plan:  OP PT Plan  Treatment/Interventions: Aquatic therapy, Biofeedback, Blood flow restriction therapy, Cryotherapy, Dry needling, Education/ Instruction, Electrical stimulation, Gait training, Hot pack, Manual therapy, Neuromuscular re-education, Self care/ home management, Taping techniques, Therapeutic activities, Therapeutic exercises, Ultrasound, Vasopneumatic device  PT Plan: Skilled PT  PT Frequency: 1 time per week  Duration: 1x a week for 5 more visits  Rehab Potential: Good  Plan of Care Agreement: Patient    Current Problem  1. Moderate ankle sprain, unspecified laterality,  subsequent encounter  Follow Up In Physical Therapy      2. Grade 2 ankle sprain  Follow Up In Physical Therapy      3. Acute left ankle pain  Follow Up In Physical Therapy    Follow Up In Physical Therapy      4. Ankle weakness  Follow Up In Physical Therapy    Follow Up In Physical Therapy          General  PT  Visit  PT Received On: 10/21/24  General  Reason for Referral: LT ANKLE  Referred By: Dr. Addie ART    Insurance   Medical [employer] 60V  Visit: 6    Subjective      Pt reports less burning this past week. He feels like the dry needling has been the change he needed in the ankle. He still does get some discomfort but not nearly as bad. He will be seeing the podiatrist tomorrow.     Precautions   N/a     Pain  Pain Assessment  Pain Assessment: 0-10  0-10 (Numeric) Pain Score: 3  Pain Type: Acute pain  Pain Location: Ankle  Pain Orientation: Left  Pain Radiating Towards: lat  Pain Descriptors: Sore    Objective   Foot/Ankle Musculoskeletal Exam  Gait    Antalgic: left    Inspection    Left      Edema: mild        Edema comment: some residual swelling sitting under lateral malleolus    Palpation    Left       Tenderness: present          Distal fibula: mild          Peroneal tendon: mild          Peroneal tendon comment: distal portion    Strength    Left      Tibialis anterior: 5/5.       Gastroc/soleus: 4+/5.       Tibialis posterior: 4+/5.       Peroneals: 4+/5. Peroneals are affected by pain.          Outcome Measures:  Other Measures  Lower Extremity Funtional Score (LEFS): 61/80  Treatments:  Therapeutic Exercise  Therapeutic Exercise Performed: Yes  Therapeutic Exercise Activity 1: recumbent bike 6 min level 3  Therapeutic Exercise Activity 2: standing ankle tilt board 3x10 all directions  Therapeutic Exercise Activity 3: DL heel raise posterior tib bias ball squeeze at heels 3x10  Therapeutic Exercise Activity 4: toe raise 1/2 foam 3x10  Therapeutic Exercise Activity 5: resisted ankle inversion/eversion  green 3x10 each    Manual Therapy  Manual Therapy Performed: Yes  Manual Therapy Activity 1: dry needling 0.30x50 mm (3), sural, peronals proximal, lateral calcaneuous with e-stim for 5 min  Manual Therapy Activity 2: IASTM gastroc/lateral ankle    OP EDUCATION:     The patient was educated on: the importance of positioning, proper posture, and body mechanics, joint mechanics and pathology, general tissue healing time, the appropriate use of heat and cold to control pain and inflammation, the importance of general therapeutic exercise, especially to stay within pain-free ROM, specific anatomy, function, & regional interdependence of involved areas, & likely cause of impairments & POC. Pt's questions were answered to their satisfaction, & pt. verbalized understanding & agreement with POC    Access Code: [email or web address]  URL: [email or web address]  Date: 08/09/2024  Prepared by: Alix Orozco    Goals:  Balance: Pt will demonstrate SL Balance time length = to uninvolved side. - goal partially met  Gait/Locomotion: Pt will demonstrate uncompensated and pain free ambulation and stair negotiation.- goal partially met  Pain: Pt will report 0/10 pain at rest, and <2/10 pain with activity.- making progress towards  Participation Restrictions: Pt will resume full participation in exercise activites with no reports of ankle pain.- not yet met  Range Of Motion/Joint Mobility: Pt will demonstrate L ankle AROM/PROM within 5 deg of contralateral side- making progress towards  Strength: Pt will demonstrate ability to perform SL calf raises to fatigue = to uninvolved side.- not yet met  Pt will report LEFS score of >/= 65/80.- not yet met

## 2024-10-24 ENCOUNTER — APPOINTMENT (OUTPATIENT)
Dept: PRIMARY CARE | Facility: CLINIC | Age: 59
End: 2024-10-24
Payer: COMMERCIAL

## 2024-10-29 ENCOUNTER — APPOINTMENT (OUTPATIENT)
Dept: PHYSICAL THERAPY | Facility: CLINIC | Age: 59
End: 2024-10-29
Payer: COMMERCIAL

## 2024-10-29 DIAGNOSIS — G89.29 CHRONIC PAIN OF LEFT ANKLE: ICD-10-CM

## 2024-10-29 DIAGNOSIS — M25.572 CHRONIC PAIN OF LEFT ANKLE: ICD-10-CM

## 2024-10-29 DIAGNOSIS — R29.898 ANKLE WEAKNESS: ICD-10-CM

## 2024-10-29 DIAGNOSIS — S93.409A GRADE 2 ANKLE SPRAIN: Primary | ICD-10-CM

## 2024-10-29 PROCEDURE — 97110 THERAPEUTIC EXERCISES: CPT | Performed by: PHYSICAL THERAPIST

## 2024-10-29 PROCEDURE — 97140 MANUAL THERAPY 1/> REGIONS: CPT | Performed by: PHYSICAL THERAPIST

## 2024-10-29 ASSESSMENT — PAIN - FUNCTIONAL ASSESSMENT: PAIN_FUNCTIONAL_ASSESSMENT: 0-10

## 2024-10-29 ASSESSMENT — PAIN DESCRIPTION - DESCRIPTORS: DESCRIPTORS: BURNING

## 2024-10-29 ASSESSMENT — PAIN SCALES - GENERAL: PAINLEVEL_OUTOF10: 4

## 2024-11-05 ENCOUNTER — APPOINTMENT (OUTPATIENT)
Dept: PHYSICAL THERAPY | Facility: CLINIC | Age: 59
End: 2024-11-05
Payer: COMMERCIAL

## 2024-11-05 DIAGNOSIS — R29.898 ANKLE WEAKNESS: ICD-10-CM

## 2024-11-05 DIAGNOSIS — S93.409D: ICD-10-CM

## 2024-11-05 DIAGNOSIS — M25.572 ACUTE LEFT ANKLE PAIN: ICD-10-CM

## 2024-11-05 PROCEDURE — 97140 MANUAL THERAPY 1/> REGIONS: CPT | Performed by: PHYSICAL THERAPIST

## 2024-11-05 PROCEDURE — 97110 THERAPEUTIC EXERCISES: CPT | Performed by: PHYSICAL THERAPIST

## 2024-11-05 ASSESSMENT — PAIN SCALES - GENERAL: PAINLEVEL_OUTOF10: 7

## 2024-11-05 ASSESSMENT — PAIN - FUNCTIONAL ASSESSMENT: PAIN_FUNCTIONAL_ASSESSMENT: 0-10

## 2024-11-05 ASSESSMENT — PAIN DESCRIPTION - DESCRIPTORS: DESCRIPTORS: BURNING

## 2024-11-05 NOTE — PROGRESS NOTES
Physical Therapy    Physical Therapy Treatment    Patient Name: Victor Manuel Sales  MRN: [id number]  Today's Date: 11/5/2024    Time Entry:   Time Calculation  Start Time: 0800  Stop Time: 0840  Time Calculation (min): 40 min     PT Therapeutic Procedures Time Entry  Manual Therapy Time Entry: 15  Therapeutic Exercise Time Entry: 25                   Assessment:   Pt reporting some more soreness in the ankle coming in today. He thinks it may have been the new balance exercises from last session but also could have been from over doing it at work.   Dry needling performed this date to sural nerve distrubution and peroneal nerves.   All needles removed, area inspected for adverse symptoms, none noted, patient educated in post- dry needling management and expected symptoms from treatment. All patient questions answered. Will re-assess at next appt and progress as able.  Added in soleus raise and progressed single limb balance and he tolerated well. Provided him an updated handout and reviewed in depth.   Had him trial some heel raise eccentrics and single limb squat knee to door and he tolerated well with some soreness post.     Plan:   Plan to continue to work on progressing towards established rehab goals as per patient tolerance.       Current Problem  1. Acute left ankle pain  Follow Up In Physical Therapy      2. Ankle weakness  Follow Up In Physical Therapy      3. Moderate ankle sprain, unspecified laterality, subsequent encounter  Follow Up In Physical Therapy          General  PT  Visit  PT Received On: 11/05/24  General  Reason for Referral: LT ANKLE  Referred By: Dr. Real DO    Insurance   Medical [place]   MMO [employer] 60V PT OT COPAY 0 DED 0  COVERAGE 100 OOP [phone] NO AUTH REQ  REF# MAGDI G [id number]   Visit: 7     Subjective      Pt reports he has been pretty sore since his last session. He isnt sure if it is from the dry needling or the new exercises from last session. He  "reports compliance with the exercises but isnt sure if he is over doing it. Pt reporting burning on the outside of the ankle to begin today.       Precautions  N/a     Pain  Pain Assessment  Pain Assessment: 0-10  0-10 (Numeric) Pain Score: 7  Pain Type: Acute pain  Pain Location: Ankle  Pain Radiating Towards: lateral  Pain Descriptors: Burning    Objective     Cues for knee bend to door needed    Light finger tip support used throughout SL balance     Treatments:  Therapeutic Exercise  Therapeutic Exercise Performed: Yes  Therapeutic Exercise Activity 1: recumbent bike 6 min level 3  Therapeutic Exercise Activity 2: SL balance black therapad x4 15\"  Therapeutic Exercise Activity 3: DL heel raise eccentric 1/2 foam 3x10  Therapeutic Exercise Activity 4: single leg squat to door tap x10 each side    Manual Therapy  Manual Therapy Performed: Yes  Manual Therapy Activity 1: dry needling 0.30x50 mm (7), sural, peronals proximal, lateral calcaneuous with e-stim for 5 min  Manual Therapy Activity 2: IASTM gastroc / lateral ankle    OP EDUCATION:    The patient was educated on: the importance of positioning, proper posture, and body mechanics, joint mechanics and pathology, general tissue healing time, the appropriate use of heat and cold to control pain and inflammation, the importance of general therapeutic exercise, especially to stay within pain-free ROM, specific anatomy, function, & regional interdependence of involved areas, & likely cause of impairments & POC. Pt's questions were answered to their satisfaction, & pt. verbalized understanding & agreement with POC    Access Code: [email or web address]  URL: [email or web address]  Date: 08/09/2024  Prepared by: Alix Orozco    Goals:  Balance: Pt will demonstrate SL Balance time length = to uninvolved side. - goal partially met  Gait/Locomotion: Pt will demonstrate uncompensated and pain free ambulation and stair negotiation.- goal partially met  Pain: " Pt will report 0/10 pain at rest, and <2/10 pain with activity.- making progress towards  Participation Restrictions: Pt will resume full participation in exercise activites with no reports of ankle pain.- not yet met  Range Of Motion/Joint Mobility: Pt will demonstrate L ankle AROM/PROM within 5 deg of contralateral side- making progress towards  Strength: Pt will demonstrate ability to perform SL calf raises to fatigue = to uninvolved side.- not yet met  Pt will report LEFS score of >/= 65/80.- not yet met

## 2024-11-12 ENCOUNTER — APPOINTMENT (OUTPATIENT)
Dept: PHYSICAL THERAPY | Facility: CLINIC | Age: 59
End: 2024-11-12
Payer: COMMERCIAL

## 2024-11-12 ENCOUNTER — APPOINTMENT (OUTPATIENT)
Dept: PRIMARY CARE | Facility: CLINIC | Age: 59
End: 2024-11-12
Payer: COMMERCIAL

## 2024-11-12 VITALS
SYSTOLIC BLOOD PRESSURE: 110 MMHG | OXYGEN SATURATION: 97 % | DIASTOLIC BLOOD PRESSURE: 70 MMHG | TEMPERATURE: 97 F | BODY MASS INDEX: 30.1 KG/M2 | HEIGHT: 71 IN | HEART RATE: 71 BPM | WEIGHT: 215 LBS | RESPIRATION RATE: 18 BRPM

## 2024-11-12 DIAGNOSIS — R53.83 OTHER FATIGUE: Primary | ICD-10-CM

## 2024-11-12 DIAGNOSIS — S93.409D: ICD-10-CM

## 2024-11-12 DIAGNOSIS — M19.92 POST-TRAUMATIC ARTHROSIS OF JOINT: ICD-10-CM

## 2024-11-12 DIAGNOSIS — M25.572 ACUTE LEFT ANKLE PAIN: ICD-10-CM

## 2024-11-12 DIAGNOSIS — L28.0 LICHEN SIMPLEX: ICD-10-CM

## 2024-11-12 DIAGNOSIS — K21.00 GASTROESOPHAGEAL REFLUX DISEASE WITH ESOPHAGITIS, UNSPECIFIED WHETHER HEMORRHAGE: ICD-10-CM

## 2024-11-12 DIAGNOSIS — M19.071 PRIMARY OSTEOARTHRITIS OF RIGHT FOOT: ICD-10-CM

## 2024-11-12 DIAGNOSIS — T14.8XXA PUNCTURE WOUND: ICD-10-CM

## 2024-11-12 DIAGNOSIS — R29.898 ANKLE WEAKNESS: ICD-10-CM

## 2024-11-12 DIAGNOSIS — Z00.00 HEALTHCARE MAINTENANCE: ICD-10-CM

## 2024-11-12 DIAGNOSIS — E55.9 VITAMIN D DEFICIENCY: ICD-10-CM

## 2024-11-12 DIAGNOSIS — M79.5 FOREIGN BODY (FB) IN SOFT TISSUE: ICD-10-CM

## 2024-11-12 PROCEDURE — 97110 THERAPEUTIC EXERCISES: CPT | Performed by: PHYSICAL THERAPIST

## 2024-11-12 PROCEDURE — 1036F TOBACCO NON-USER: CPT | Performed by: FAMILY MEDICINE

## 2024-11-12 PROCEDURE — 99396 PREV VISIT EST AGE 40-64: CPT | Performed by: FAMILY MEDICINE

## 2024-11-12 PROCEDURE — 90471 IMMUNIZATION ADMIN: CPT | Performed by: FAMILY MEDICINE

## 2024-11-12 PROCEDURE — 90715 TDAP VACCINE 7 YRS/> IM: CPT | Performed by: FAMILY MEDICINE

## 2024-11-12 PROCEDURE — 97140 MANUAL THERAPY 1/> REGIONS: CPT | Performed by: PHYSICAL THERAPIST

## 2024-11-12 PROCEDURE — 3008F BODY MASS INDEX DOCD: CPT | Performed by: FAMILY MEDICINE

## 2024-11-12 RX ORDER — OMEPRAZOLE 20 MG/1
20 CAPSULE, DELAYED RELEASE ORAL DAILY
Qty: 90 CAPSULE | Refills: 3 | Status: SHIPPED | OUTPATIENT
Start: 2024-11-12

## 2024-11-12 ASSESSMENT — PATIENT HEALTH QUESTIONNAIRE - PHQ9
9. THOUGHTS THAT YOU WOULD BE BETTER OFF DEAD, OR OF HURTING YOURSELF: NOT AT ALL
1. LITTLE INTEREST OR PLEASURE IN DOING THINGS: MORE THAN HALF THE DAYS
4. FEELING TIRED OR HAVING LITTLE ENERGY: NEARLY EVERY DAY
2. FEELING DOWN, DEPRESSED OR HOPELESS: MORE THAN HALF THE DAYS
8. MOVING OR SPEAKING SO SLOWLY THAT OTHER PEOPLE COULD HAVE NOTICED. OR THE OPPOSITE, BEING SO FIGETY OR RESTLESS THAT YOU HAVE BEEN MOVING AROUND A LOT MORE THAN USUAL: NOT AT ALL
SUM OF ALL RESPONSES TO PHQ QUESTIONS 1-9: 10
3. TROUBLE FALLING OR STAYING ASLEEP OR SLEEPING TOO MUCH: NEARLY EVERY DAY
7. TROUBLE CONCENTRATING ON THINGS, SUCH AS READING THE NEWSPAPER OR WATCHING TELEVISION: NOT AT ALL
SUM OF ALL RESPONSES TO PHQ9 QUESTIONS 1 AND 2: 4
5. POOR APPETITE OR OVEREATING: NOT AT ALL
6. FEELING BAD ABOUT YOURSELF - OR THAT YOU ARE A FAILURE OR HAVE LET YOURSELF OR YOUR FAMILY DOWN: NOT AT ALL

## 2024-11-12 ASSESSMENT — PAIN DESCRIPTION - DESCRIPTORS: DESCRIPTORS: BURNING

## 2024-11-12 ASSESSMENT — ENCOUNTER SYMPTOMS
BLOOD IN STOOL: 0
POLYDIPSIA: 0
TREMORS: 0
SINUS PAIN: 0
SPEECH DIFFICULTY: 0
TROUBLE SWALLOWING: 0
MYALGIAS: 0
ARTHRALGIAS: 1
SHORTNESS OF BREATH: 0
DIARRHEA: 0
NECK PAIN: 0
WHEEZING: 0
HEADACHES: 0
DIZZINESS: 0
LIGHT-HEADEDNESS: 0
PALPITATIONS: 0
RECTAL PAIN: 0
BRUISES/BLEEDS EASILY: 0
COUGH: 0
ANAL BLEEDING: 0
FLANK PAIN: 0
DIFFICULTY URINATING: 1
FREQUENCY: 0
CHEST TIGHTNESS: 0
PHOTOPHOBIA: 0
BACK PAIN: 1
CHOKING: 0
JOINT SWELLING: 0
ABDOMINAL PAIN: 0
FATIGUE: 1
NAUSEA: 0
NUMBNESS: 0
SEIZURES: 0
POLYPHAGIA: 0
HEMATURIA: 0
SORE THROAT: 0

## 2024-11-12 ASSESSMENT — PAIN - FUNCTIONAL ASSESSMENT: PAIN_FUNCTIONAL_ASSESSMENT: 0-10

## 2024-11-12 ASSESSMENT — PAIN SCALES - GENERAL: PAINLEVEL_OUTOF10: 5 - MODERATE PAIN

## 2024-11-12 NOTE — PATIENT INSTRUCTIONS
Please consider the following medications to help    mitigate  Covid during this time  Vitamin  mg  chewBLE  daily  B complex daily   B12  2500  international units   DAILY   ZINC   30 -   DAILY     VITAMIN D 3   200O IU DAILY        Multivitamin with zinc  Extra rest  Stress reduction       Please consider exercise program involving walking or some other form of aerobic activity 5 days weekly for 30 minutes... Let's also consider strengthening of large muscle groups like the abdominal muscles or shoulder muscles... Twice weekly with reps of 5/10/15 exercises and gradually increase strength.. This is not heavy strength training but light weight training... Sit ups or back exercise routine.. Please ask for handout if uncertain how to do..This  will help to strengthen your muscles which in turn will help you to lose weight.... You might ask what is the best diet available.. I would strongly encourage you to consider  Weight Watchers.. And as  your  fellow on  Weight Watchers physician attempting to  live this  LIFE  style  choice with you....  I will be glad to give you recommendations on what to eat.. Consider buying Brigette bread.., susana bagle thin bread.. oikos yogurt... eggs  to eat as hard-boiled... Halo top ice cream for snack... All these are delicious foods which.. when eaten and  being compliant eating three  meals daily  breakfast lunch and dinner, drinking  64 ounces of water daily we will all win together !!!!!!!. This will be a means for you to lose weight... Consider also the smart phone home ... My plate.. Or My  fitness  pal..,  as additional possibilities for weight loss... Good  luck  Dr. SUKHDEEP Real!    Discussed medication side effects.  The  risk benefits and treatment options  discussed with patient.       Please schedule follow-up appointment based upon your improvement/failure to improve/chronic medical conditions and physician recommendations during office appointment at the .        Patient advised to go to er if symptoms worsen or to call answering service, or to return to office for additional evaluation    This note was partially  generated using Dragon voice recognition and there may be incorrect words, wording, spelling, or pronunciation errors that were not corrected prior to committing the note to the medical record.

## 2024-11-12 NOTE — ASSESSMENT & PLAN NOTE
Will right thumb with puncture give tetanus and observe if increasing redness call back for now x-ray

## 2024-11-12 NOTE — PROGRESS NOTES
"Victor Manuel Sales \"[place]" is a 59 y.o. male here today a periodic health exam.  I reviewed previous preventative health measures including screening tests, immunizations and labs.      HPI   CURRENT COMPLAINTS OR CONCERNS:   grief      PREVIOUS PREVENTATIVE HEALTH  Colonoscopy : YES -- DATE   Cologuard : N/A  PSA : YES -- DATE   Hepatitis C Antibody : NO  HIV Screening : NO  Prevnar : NO  Tdap : YES -- DATE   Shingrix : NO  Yearly Flu Shot : NO    CURRENT FINDINGS  Healthy Diet : YEScould improve  Exercise :  NO  Depression Issues : YES --  counseling  Alcohol Use : NO  Tobacco Use : NO        Current Outpatient Medications:     meloxicam (Mobic) 15 mg tablet, Take 1 tablet (15 mg) by mouth once daily., Disp: 90 tablet, Rfl: 0    methylPREDNISolone (Medrol Dospak) 4 mg tablets, Follow schedule on package instructions, Disp: 1 each, Rfl: 0    omeprazole (PriLOSEC) 20 mg DR capsule, Take 1 capsule (20 mg) by mouth once daily., Disp: 90 capsule, Rfl: 3    Past Medical History:   Diagnosis Date    Other specified health status     No pertinent past medical history    Visual impairment 2023       Past Surgical History:   Procedure Laterality Date    CHOLECYSTECTOMY  ?    CT HEAD ANGIO W AND WO IV CONTRAST  2023    CT HEAD ANGIO W AND WO IV CONTRAST 2023 [facility]    OTHER SURGICAL HISTORY  10/14/2019    Gallbladder surgery    TONSILLECTOMY  ?       Family History   Problem Relation Name Age of Onset    Diabetes Mother Mom     Blood clot Father Dad          at 92    Cancer Father Dad     Multiple sclerosis Sister          decreased  60    Cancer Brother Brother         hodgkins       Social History     Tobacco Use    Smoking status: Former     Types: Cigars    Smokeless tobacco: Former   Vaping Use    Vaping status: Never Used   Substance Use Topics    Alcohol use: Not Currently    Drug use: Yes     Types: Marijuana     Comment: Before bedtime.       Immunization History   Administered " Date(s) Administered    Flu vaccine, quadrivalent, no egg protein, age 6 month or greater (FLUCELVAX) 10/03/2019    Hepatitis A vaccine, age 19 years and greater (HAVRIX) 08/17/2015       Review of Systems   Constitutional:  Positive for fatigue.   HENT:  Negative for ear pain, sinus pain, sore throat and trouble swallowing.    Eyes:  Negative for photophobia.   Respiratory:  Negative for cough, choking, chest tightness, shortness of breath and wheezing.    Cardiovascular:  Negative for chest pain, palpitations and leg swelling.   Gastrointestinal:  Negative for abdominal pain, anal bleeding, blood in stool, diarrhea, nausea and rectal pain.   Endocrine: Negative for cold intolerance, heat intolerance, polydipsia, polyphagia and polyuria.   Genitourinary:  Positive for difficulty urinating. Negative for decreased urine volume, flank pain, frequency, genital sores, hematuria, penile swelling, scrotal swelling, testicular pain and urgency.   Musculoskeletal:  Positive for arthralgias and back pain. Negative for joint swelling, myalgias and neck pain.   Skin:  Negative for rash.   Neurological:  Negative for dizziness, tremors, seizures, syncope, speech difficulty, light-headedness, numbness and headaches.   Hematological:  Does not bruise/bleed easily.         Objective    Visit Vitals  /70   Pulse 71   Temp 36.1 °C (97 °F)   Resp 18       Physical Exam  Vitals reviewed.   Constitutional:       Appearance: Normal appearance.   HENT:      Head: Normocephalic.      Right Ear: External ear normal.      Left Ear: External ear normal.      Nose: Nose normal.      Mouth/Throat:      Mouth: Mucous membranes are moist.   Eyes:      Conjunctiva/sclera: Conjunctivae normal.   Cardiovascular:      Rate and Rhythm: Regular rhythm.      Heart sounds: Normal heart sounds.   Pulmonary:      Effort: Pulmonary effort is normal.      Breath sounds: Normal breath sounds.   Abdominal:      General: Bowel sounds are normal.       Palpations: Abdomen is soft.   Musculoskeletal:         General: Normal range of motion.      Cervical back: Neck supple.   Skin:     General: Skin is warm and dry.   Neurological:      General: No focal deficit present.      Mental Status: He is alert and oriented to person, place, and time.   Psychiatric:         Behavior: Behavior normal.         Judgment: Judgment normal.            Assessment    1. Other fatigue  Thyroid Stimulating Hormone, Testosterone, Thyroxine, Free      2. Healthcare maintenance  CBC and Auto Differential, Comprehensive Metabolic Panel, Lipid Panel, Prostate Specific Antigen, Screen      3. Foreign body (FB) in soft tissue  XR thumb right MIN 2 views      4. Gastroesophageal reflux disease with esophagitis, unspecified whether hemorrhage  omeprazole (PriLOSEC) 20 mg DR capsule, Vitamin B12      5. Puncture wound        6. Post-traumatic arthrosis of joint        7. Primary osteoarthritis of right foot        8. Lichen simplex        9. Vitamin D deficiency  Vitamin D 25-Hydroxy,Total (for eval of Vitamin D levels)

## 2024-11-12 NOTE — PROGRESS NOTES
Physical Therapy    Physical Therapy Treatment    Patient Name: Victor Manuel Sales  MRN: [id number]  Today's Date: 11/12/2024    Time Entry:   Time Calculation  Start Time: 0757  Stop Time: 0845  Time Calculation (min): 48 min     PT Therapeutic Procedures Time Entry  Manual Therapy Time Entry: 26  Therapeutic Exercise Time Entry: 22                   Assessment:   Pt reporting improvement in his pain this morning. He has been trying to be consistent with his exercises at home but he does still get sore from work. Discussed that he can alternate his days with exercises at home to help modify. Pt continues to respond well to dry needling each session.   Dry needling performed this date to gastroc/lateral ankle.  All needles removed, area inspected for adverse symptoms, none noted, patient educated in post- dry needling management and expected symptoms from treatment. All patient questions answered. Will re-assess at next appt and progress as able.    Plan:   Plan to continue to work on progressing towards established rehab goals as per patient tolerance.       Current Problem  1. Acute left ankle pain  Follow Up In Physical Therapy      2. Ankle weakness  Follow Up In Physical Therapy      3. Moderate ankle sprain, unspecified laterality, subsequent encounter  Follow Up In Physical Therapy          General  PT  Visit  PT Received On: 11/12/24  General  Reason for Referral: LT ANKLE  Referred By: Dr. wick DO    Insurance   Medical [place]   MMO SU[employer] MED 60V PT OT COPAY 0 DED 0  COVERAGE 100 OOP [phone] NO AUTH REQ  REF# MAGDI G [id number]/ALL   Visit: 8    Subjective      Pt reports he was pretty sore in the calf last week but it resolved some after about a day or so. He states it feels better today coming in.     Precautions  N/a     Pain  Pain Assessment  Pain Assessment: 0-10  0-10 (Numeric) Pain Score: 5 - Moderate pain  Pain Type: Acute pain  Pain Location: Ankle  Pain Radiating Towards:  "lateral  Pain Descriptors: Burning    Objective     Some slight difficulty noted with counter clockwise tilt board rotations     Treatments:  Therapeutic Exercise  Therapeutic Exercise Performed: Yes  Therapeutic Exercise Activity 1: recumbent bike 6 min level 3  Therapeutic Exercise Activity 2: resisted ankle inversion/eversion blue 3x10 each  Therapeutic Exercise Activity 3: supine sural nerve glide bias x30 2\"  Therapeutic Exercise Activity 4: standing ankle tilt board 3x10 all directions  Therapeutic Exercise Activity 5: SL balance on bosu x4 20\"    Manual Therapy  Manual Therapy Performed: Yes  Manual Therapy Activity 1: dry needling 0.30x50 mm (7), sural, peronals proximal, lateral calcaneuous with e-stim for 5 min  Manual Therapy Activity 2: IASTM gastroc /soleus    OP EDUCATION:    The patient was educated on: the importance of positioning, proper posture, and body mechanics, joint mechanics and pathology, general tissue healing time, the appropriate use of heat and cold to control pain and inflammation, the importance of general therapeutic exercise, especially to stay within pain-free ROM, specific anatomy, function, & regional interdependence of involved areas, & likely cause of impairments & POC. Pt's questions were answered to their satisfaction, & pt. verbalized understanding & agreement with POC    Access Code: [email or web address]  URL: [email or web address]  Date: 08/09/2024  Prepared by: Alix Orozco    Goals:  Balance: Pt will demonstrate SL Balance time length = to uninvolved side. - goal partially met  Gait/Locomotion: Pt will demonstrate uncompensated and pain free ambulation and stair negotiation.- goal partially met  Pain: Pt will report 0/10 pain at rest, and <2/10 pain with activity.- making progress towards  Participation Restrictions: Pt will resume full participation in exercise activites with no reports of ankle pain.- not yet met  Range Of Motion/Joint Mobility: Pt will " demonstrate L ankle AROM/PROM within 5 deg of contralateral side- making progress towards  Strength: Pt will demonstrate ability to perform SL calf raises to fatigue = to uninvolved side.- not yet met  Pt will report LEFS score of >/= 65/80.- not yet met

## 2024-11-16 ENCOUNTER — LAB (OUTPATIENT)
Dept: LAB | Facility: LAB | Age: 59
End: 2024-11-16
Payer: COMMERCIAL

## 2024-11-16 ENCOUNTER — HOSPITAL ENCOUNTER (OUTPATIENT)
Dept: RADIOLOGY | Facility: HOSPITAL | Age: 59
Discharge: HOME | End: 2024-11-16
Payer: COMMERCIAL

## 2024-11-16 DIAGNOSIS — K21.9 GASTROESOPHAGEAL REFLUX DISEASE, UNSPECIFIED WHETHER ESOPHAGITIS PRESENT: ICD-10-CM

## 2024-11-16 DIAGNOSIS — Z00.00 HEALTHCARE MAINTENANCE: ICD-10-CM

## 2024-11-16 DIAGNOSIS — E55.9 VITAMIN D DEFICIENCY: ICD-10-CM

## 2024-11-16 DIAGNOSIS — R53.83 OTHER FATIGUE: ICD-10-CM

## 2024-11-16 DIAGNOSIS — K21.00 GASTROESOPHAGEAL REFLUX DISEASE WITH ESOPHAGITIS, UNSPECIFIED WHETHER HEMORRHAGE: ICD-10-CM

## 2024-11-16 DIAGNOSIS — M79.5 FOREIGN BODY (FB) IN SOFT TISSUE: ICD-10-CM

## 2024-11-16 LAB
25(OH)D3 SERPL-MCNC: 14 NG/ML (ref 30–100)
ALBUMIN SERPL BCP-MCNC: 4.3 G/DL (ref 3.4–5)
ALP SERPL-CCNC: 85 U/L (ref 33–120)
ALT SERPL W P-5'-P-CCNC: 15 U/L (ref 10–52)
ANION GAP SERPL CALC-SCNC: 10 MMOL/L (ref 10–20)
AST SERPL W P-5'-P-CCNC: 16 U/L (ref 9–39)
BASOPHILS # BLD AUTO: 0.03 X10*3/UL (ref 0–0.1)
BASOPHILS NFR BLD AUTO: 0.6 %
BILIRUB SERPL-MCNC: 1.1 MG/DL (ref 0–1.2)
BUN SERPL-MCNC: 18 MG/DL (ref 6–23)
CALCIUM SERPL-MCNC: 8.7 MG/DL (ref 8.6–10.3)
CHLORIDE SERPL-SCNC: 109 MMOL/L (ref 98–107)
CHOLEST SERPL-MCNC: 185 MG/DL (ref 0–199)
CHOLESTEROL/HDL RATIO: 3.7
CO2 SERPL-SCNC: 26 MMOL/L (ref 21–32)
CREAT SERPL-MCNC: 0.83 MG/DL (ref 0.5–1.3)
EGFRCR SERPLBLD CKD-EPI 2021: >90 ML/MIN/1.73M*2
EOSINOPHIL # BLD AUTO: 0.34 X10*3/UL (ref 0–0.7)
EOSINOPHIL NFR BLD AUTO: 6.3 %
ERYTHROCYTE [DISTWIDTH] IN BLOOD BY AUTOMATED COUNT: 12.7 % (ref 11.5–14.5)
GLUCOSE SERPL-MCNC: 94 MG/DL (ref 74–99)
HCT VFR BLD AUTO: 42 % (ref 41–52)
HDLC SERPL-MCNC: 49.7 MG/DL
HGB BLD-MCNC: 14 G/DL (ref 13.5–17.5)
IMM GRANULOCYTES # BLD AUTO: 0.02 X10*3/UL (ref 0–0.7)
IMM GRANULOCYTES NFR BLD AUTO: 0.4 % (ref 0–0.9)
LDLC SERPL CALC-MCNC: 119 MG/DL
LYMPHOCYTES # BLD AUTO: 1.5 X10*3/UL (ref 1.2–4.8)
LYMPHOCYTES NFR BLD AUTO: 27.6 %
MAGNESIUM SERPL-MCNC: 1.87 MG/DL (ref 1.6–2.4)
MCH RBC QN AUTO: 32 PG (ref 26–34)
MCHC RBC AUTO-ENTMCNC: 33.3 G/DL (ref 32–36)
MCV RBC AUTO: 96 FL (ref 80–100)
MONOCYTES # BLD AUTO: 0.43 X10*3/UL (ref 0.1–1)
MONOCYTES NFR BLD AUTO: 7.9 %
NEUTROPHILS # BLD AUTO: 3.11 X10*3/UL (ref 1.2–7.7)
NEUTROPHILS NFR BLD AUTO: 57.2 %
NON HDL CHOLESTEROL: 135 MG/DL (ref 0–149)
NRBC BLD-RTO: 0 /100 WBCS (ref 0–0)
PLATELET # BLD AUTO: 198 X10*3/UL (ref 150–450)
POTASSIUM SERPL-SCNC: 4.5 MMOL/L (ref 3.5–5.3)
PROT SERPL-MCNC: 6.7 G/DL (ref 6.4–8.2)
PSA SERPL-MCNC: 5.56 NG/ML
RBC # BLD AUTO: 4.37 X10*6/UL (ref 4.5–5.9)
SODIUM SERPL-SCNC: 140 MMOL/L (ref 136–145)
T4 FREE SERPL-MCNC: 0.96 NG/DL (ref 0.61–1.12)
TESTOST SERPL-MCNC: 747 NG/DL (ref 240–1000)
TRIGL SERPL-MCNC: 81 MG/DL (ref 0–149)
TSH SERPL-ACNC: 0.91 MIU/L (ref 0.44–3.98)
VIT B12 SERPL-MCNC: 188 PG/ML (ref 211–911)
VLDL: 16 MG/DL (ref 0–40)
WBC # BLD AUTO: 5.4 X10*3/UL (ref 4.4–11.3)

## 2024-11-16 PROCEDURE — 80053 COMPREHEN METABOLIC PANEL: CPT

## 2024-11-16 PROCEDURE — 73140 X-RAY EXAM OF FINGER(S): CPT | Mod: RT

## 2024-11-16 PROCEDURE — 85025 COMPLETE CBC W/AUTO DIFF WBC: CPT

## 2024-11-16 PROCEDURE — 84443 ASSAY THYROID STIM HORMONE: CPT

## 2024-11-16 PROCEDURE — 80061 LIPID PANEL: CPT

## 2024-11-16 PROCEDURE — 83735 ASSAY OF MAGNESIUM: CPT

## 2024-11-16 PROCEDURE — 84153 ASSAY OF PSA TOTAL: CPT

## 2024-11-16 PROCEDURE — 84439 ASSAY OF FREE THYROXINE: CPT

## 2024-11-16 PROCEDURE — 82306 VITAMIN D 25 HYDROXY: CPT

## 2024-11-16 PROCEDURE — 84403 ASSAY OF TOTAL TESTOSTERONE: CPT

## 2024-11-16 PROCEDURE — 82607 VITAMIN B-12: CPT

## 2024-11-16 PROCEDURE — 36415 COLL VENOUS BLD VENIPUNCTURE: CPT

## 2024-11-19 ENCOUNTER — APPOINTMENT (OUTPATIENT)
Dept: PHYSICAL THERAPY | Facility: CLINIC | Age: 59
End: 2024-11-19
Payer: COMMERCIAL

## 2024-11-20 ENCOUNTER — TELEPHONE (OUTPATIENT)
Dept: PRIMARY CARE | Facility: CLINIC | Age: 59
End: 2024-11-20
Payer: COMMERCIAL

## 2024-11-20 NOTE — TELEPHONE ENCOUNTER
----- Message from Dalila DOMÍNGUEZ sent at 11/19/2024  3:14 PM EST -----  Can we call to schedule Virtual to discuss labs   Thanks!  ----- Message -----  From: Emil Real DO  Sent: 11/18/2024   8:43 PM EST  To: Indira Garcia; #     Inform cholesterol 185  triglycerides 81 Numbers look fairly good kidney function excellent liver tests okay vitamin D however on the low side and would start on 5000 IUs daily over-the-counter B12 also low and will start B12 2500 IUs daily and get over-the-counter PSA is up no anemia with testosterone looking satisfactory my recommendation would be to schedule virtual visit to discuss this   Transitional Care Management Telephone Call Attempt    Discharge Date: 1/28/23   Severe hyperglycemia    Discharge Location: [facility]    Call Attempt Date: 1/31/2023  Call Attempt: First

## 2024-11-21 ENCOUNTER — TELEMEDICINE (OUTPATIENT)
Dept: PRIMARY CARE | Facility: CLINIC | Age: 59
End: 2024-11-21
Payer: COMMERCIAL

## 2024-11-21 DIAGNOSIS — E55.9 VITAMIN D DEFICIENCY: Primary | ICD-10-CM

## 2024-11-21 DIAGNOSIS — K21.00 GASTROESOPHAGEAL REFLUX DISEASE WITH ESOPHAGITIS, UNSPECIFIED WHETHER HEMORRHAGE: ICD-10-CM

## 2024-11-21 DIAGNOSIS — R97.20 ELEVATED PSA MEASUREMENT: ICD-10-CM

## 2024-11-21 PROCEDURE — 1036F TOBACCO NON-USER: CPT | Performed by: FAMILY MEDICINE

## 2024-11-21 PROCEDURE — 99213 OFFICE O/P EST LOW 20 MIN: CPT | Performed by: FAMILY MEDICINE

## 2024-11-21 RX ORDER — VIT C/E/ZN/COPPR/LUTEIN/ZEAXAN 250MG-90MG
5000 CAPSULE ORAL DAILY
Qty: 90 CAPSULE | Refills: 1 | Status: SHIPPED | OUTPATIENT
Start: 2024-11-21 | End: 2024-11-21 | Stop reason: SDUPTHER

## 2024-11-21 RX ORDER — VIT C/E/ZN/COPPR/LUTEIN/ZEAXAN 250MG-90MG
1 CAPSULE ORAL DAILY
Qty: 90 TABLET | Refills: 3 | Status: SHIPPED | OUTPATIENT
Start: 2024-11-21 | End: 2024-11-21 | Stop reason: SDUPTHER

## 2024-11-21 RX ORDER — VIT C/E/ZN/COPPR/LUTEIN/ZEAXAN 250MG-90MG
5000 CAPSULE ORAL DAILY
Qty: 90 CAPSULE | Refills: 1 | Status: SHIPPED | OUTPATIENT
Start: 2024-11-21

## 2024-11-21 RX ORDER — VIT C/E/ZN/COPPR/LUTEIN/ZEAXAN 250MG-90MG
1 CAPSULE ORAL DAILY
Qty: 90 TABLET | Refills: 3 | Status: SHIPPED | OUTPATIENT
Start: 2024-11-21

## 2024-11-21 NOTE — PROGRESS NOTES
"Subjective   Patient ID: Victor Manuel Sales \"Alex\" is a 59 y.o. male who presents for No chief complaint on file..  HPI This visit was completed via virtual  visit...due to the restrictions of patients schedule. All issues as below were discussed and addressed, but physical examination was limited to visual inspection only and was performed.. IN THIS restricted fashion. iF It was felt that the patient should be evaluated in clinic then  .they were directed there. The patient verbally consented to visit.  I started lizabeth, dental Patient here for evaluation after last visit patient had complaintsHaving lab testing performed.  Number of labs abnormal specifically vitamin D low at 14 with vitamin B12 low at 188 and free thyroxine 0.96 with results of testosterone at 747.    At last visit he had complaints of of fatigue    Review of Systems  All other  pertinent  systems reviewed and are negative except  those  mentioned  in HPI   Objective   Physical Exam  Physical examination the visual auditory observations  Vital signs none provided by patient  General no acute distress alert and oriented  Head and neck no obvious mass or deformity appreciated no obvious xanthelasma  Lungs no obvious respiratory distress. No audible wheezes noted  Abdomen..Obesity appreciated  Extremities no visual  abnormalties appreciated noted  Neuro. No visually apparent deficits  Psychiatric. Well with normal mood   Labs  Last 12 months   Lab on 11/16/2024   Component Date Value Ref Range Status    WBC 11/16/2024 5.4  4.4 - 11.3 x10*3/uL Final    nRBC 11/16/2024 0.0  0.0 - 0.0 /100 WBCs Final    RBC 11/16/2024 4.37 (L)  4.50 - 5.90 x10*6/uL Final    Hemoglobin 11/16/2024 14.0  13.5 - 17.5 g/dL Final    Hematocrit 11/16/2024 42.0  41.0 - 52.0 % Final    MCV 11/16/2024 96  80 - 100 fL Final    MCH 11/16/2024 32.0  26.0 - 34.0 pg Final    MCHC 11/16/2024 33.3  32.0 - 36.0 g/dL Final    RDW 11/16/2024 12.7  11.5 - 14.5 % Final    Platelets 11/16/2024 " 198  150 - 450 x10*3/uL Final    Neutrophils % 11/16/2024 57.2  40.0 - 80.0 % Final    Immature Granulocytes %, Automated 11/16/2024 0.4  0.0 - 0.9 % Final    Immature Granulocyte Count (IG) includes promyelocytes, myelocytes and metamyelocytes but does not include bands. Percent differential counts (%) should be interpreted in the context of the absolute cell counts (cells/UL).    Lymphocytes % 11/16/2024 27.6  13.0 - 44.0 % Final    Monocytes % 11/16/2024 7.9  2.0 - 10.0 % Final    Eosinophils % 11/16/2024 6.3  0.0 - 6.0 % Final    Basophils % 11/16/2024 0.6  0.0 - 2.0 % Final    Neutrophils Absolute 11/16/2024 3.11  1.20 - 7.70 x10*3/uL Final    Percent differential counts (%) should be interpreted in the context of the absolute cell counts (cells/uL).    Immature Granulocytes Absolute, Au* 11/16/2024 0.02  0.00 - 0.70 x10*3/uL Final    Lymphocytes Absolute 11/16/2024 1.50  1.20 - 4.80 x10*3/uL Final    Monocytes Absolute 11/16/2024 0.43  0.10 - 1.00 x10*3/uL Final    Eosinophils Absolute 11/16/2024 0.34  0.00 - 0.70 x10*3/uL Final    Basophils Absolute 11/16/2024 0.03  0.00 - 0.10 x10*3/uL Final    Glucose 11/16/2024 94  74 - 99 mg/dL Final    Sodium 11/16/2024 140  136 - 145 mmol/L Final    Potassium 11/16/2024 4.5  3.5 - 5.3 mmol/L Final    Chloride 11/16/2024 109 (H)  98 - 107 mmol/L Final    Bicarbonate 11/16/2024 26  21 - 32 mmol/L Final    Anion Gap 11/16/2024 10  10 - 20 mmol/L Final    Urea Nitrogen 11/16/2024 18  6 - 23 mg/dL Final    Creatinine 11/16/2024 0.83  0.50 - 1.30 mg/dL Final    eGFR 11/16/2024 >90  >60 mL/min/1.73m*2 Final    Calculations of estimated GFR are performed using the 2021 CKD-EPI Study Refit equation without the race variable for the IDMS-Traceable creatinine methods.  https://jasn.asnjournals.org/content/early/2021/09/22/ASN.6474199271    Calcium 11/16/2024 8.7  8.6 - 10.3 mg/dL Final    Albumin 11/16/2024 4.3  3.4 - 5.0 g/dL Final    Alkaline Phosphatase 11/16/2024 85  33 - 120  U/L Final    Total Protein 11/16/2024 6.7  6.4 - 8.2 g/dL Final    AST 11/16/2024 16  9 - 39 U/L Final    Bilirubin, Total 11/16/2024 1.1  0.0 - 1.2 mg/dL Final    ALT 11/16/2024 15  10 - 52 U/L Final    Patients treated with Sulfasalazine may generate falsely decreased results for ALT.    Magnesium 11/16/2024 1.87  1.60 - 2.40 mg/dL Final    Cholesterol 11/16/2024 185  0 - 199 mg/dL Final          Age      Desirable   Borderline High   High     0-19 Y     0 - 169       170 - 199     >/= 200    20-24 Y     0 - 189       190 - 224     >/= 225         >24 Y     0 - 199       200 - 239     >/= 240   **All ranges are based on fasting samples. Specific   therapeutic targets will vary based on patient-specific   cardiac risk.    Pediatric guidelines reference:Pediatrics 2011, 128(S5).Adult guidelines reference: NCEP ATPIII Guidelines,JEN 2001, 258:2486-97    Venipuncture immediately after or during the administration of Metamizole may lead to falsely low results. Testing should be performed immediately prior to Metamizole dosing.    HDL-Cholesterol 11/16/2024 49.7  mg/dL Final      Age       Very Low   Low     Normal    High    0-19 Y    < 35      < 40     40-45     ----  20-24 Y    ----     < 40      >45      ----        >24 Y      ----     < 40     40-60      >60      Cholesterol/HDL Ratio 11/16/2024 3.7   Final      Ref Values  Desirable  < 3.4  High Risk  > 5.0    LDL Calculated 11/16/2024 119 (H)  <=99 mg/dL Final                                Near   Borderline      AGE      Desirable  Optimal    High     High     Very High     0-19 Y     0 - 109     ---    110-129   >/= 130     ----    20-24 Y     0 - 119     ---    120-159   >/= 160     ----      >24 Y     0 -  99   100-129  130-159   160-189     >/=190      VLDL 11/16/2024 16  0 - 40 mg/dL Final    Triglycerides 11/16/2024 81  0 - 149 mg/dL Final    Age              Desirable        Borderline         High        Very High  SEX:B           mg/dL              mg/dL               mg/dL      mg/dL  <=14D                       ----               ----        ----  15D-365D                    ----               ----        ----  1Y-9Y           0-74               75-99             >=100       ----  10Y-19Y        0-89                            >=130       ----  20Y-24Y        0-114             115-149             >=150      ----  >= 25Y         0-149             150-199             200-499    >=500      Venipuncture immediately after or during the administration of Metamizole may lead to falsely low results. Testing should be performed immediately prior to Metamizole dosing.    Non HDL Cholesterol 11/16/2024 135  0 - 149 mg/dL Final          Age       Desirable   Borderline High   High     Very High     0-19 Y     0 - 119       120 - 144     >/= 145    >/= 160    20-24 Y     0 - 149       150 - 189     >/= 190      ----         >24 Y    30 mg/dL above LDL Cholesterol goal      Prostate Specific Antigen,Screen 11/16/2024 5.56 (H)  <=4.00 ng/mL Final    Thyroid Stimulating Hormone 11/16/2024 0.91  0.44 - 3.98 mIU/L Final    Testosterone 11/16/2024 747  240 - 1,000 ng/dL Final    Thyroxine, Free 11/16/2024 0.96  0.61 - 1.12 ng/dL Final    Vitamin B12 11/16/2024 188 (L)  211 - 911 pg/mL Final    Vitamin D, 25-Hydroxy, Total 11/16/2024 14 (L)  30 - 100 ng/mL Final   Admission on 12/04/2023, Discharged on 12/05/2023   Component Date Value Ref Range Status    WBC 12/04/2023 5.8  4.4 - 11.3 x10*3/uL Final    nRBC 12/04/2023 0.0  0.0 - 0.0 /100 WBCs Final    RBC 12/04/2023 4.98  4.50 - 5.90 x10*6/uL Final    Hemoglobin 12/04/2023 15.7  13.5 - 17.5 g/dL Final    Hematocrit 12/04/2023 46.3  41.0 - 52.0 % Final    MCV 12/04/2023 93  80 - 100 fL Final    MCH 12/04/2023 31.5  26.0 - 34.0 pg Final    MCHC 12/04/2023 33.9  32.0 - 36.0 g/dL Final    RDW 12/04/2023 12.2  11.5 - 14.5 % Final    Platelets 12/04/2023 210  150 - 450 x10*3/uL Final    Neutrophils % 12/04/2023  60.0  40.0 - 80.0 % Final    Immature Granulocytes %, Automated 12/04/2023 0.3  0.0 - 0.9 % Final    Immature Granulocyte Count (IG) includes promyelocytes, myelocytes and metamyelocytes but does not include bands. Percent differential counts (%) should be interpreted in the context of the absolute cell counts (cells/UL).    Lymphocytes % 12/04/2023 28.2  13.0 - 44.0 % Final    Monocytes % 12/04/2023 8.9  2.0 - 10.0 % Final    Eosinophils % 12/04/2023 2.1  0.0 - 6.0 % Final    Basophils % 12/04/2023 0.5  0.0 - 2.0 % Final    Neutrophils Absolute 12/04/2023 3.45  1.20 - 7.70 x10*3/uL Final    Percent differential counts (%) should be interpreted in the context of the absolute cell counts (cells/uL).    Immature Granulocytes Absolute, Au* 12/04/2023 0.02  0.00 - 0.70 x10*3/uL Final    Lymphocytes Absolute 12/04/2023 1.62  1.20 - 4.80 x10*3/uL Final    Monocytes Absolute 12/04/2023 0.51  0.10 - 1.00 x10*3/uL Final    Eosinophils Absolute 12/04/2023 0.12  0.00 - 0.70 x10*3/uL Final    Basophils Absolute 12/04/2023 0.03  0.00 - 0.10 x10*3/uL Final    Glucose 12/04/2023 90  74 - 99 mg/dL Final    Sodium 12/04/2023 138  136 - 145 mmol/L Final    Potassium 12/04/2023 3.8  3.5 - 5.3 mmol/L Final    Chloride 12/04/2023 106  98 - 107 mmol/L Final    Bicarbonate 12/04/2023 25  21 - 32 mmol/L Final    Anion Gap 12/04/2023 11  10 - 20 mmol/L Final    Urea Nitrogen 12/04/2023 12  6 - 23 mg/dL Final    Creatinine 12/04/2023 0.89  0.50 - 1.30 mg/dL Final    eGFR 12/04/2023 >90  >60 mL/min/1.73m*2 Final    Calculations of estimated GFR are performed using the 2021 CKD-EPI Study Refit equation without the race variable for the IDMS-Traceable creatinine methods.  https://jasn.asnjournals.org/content/early/2021/09/22/ASN.9369879934    Calcium 12/04/2023 9.4  8.6 - 10.3 mg/dL Final    Albumin 12/04/2023 4.6  3.4 - 5.0 g/dL Final    Alkaline Phosphatase 12/04/2023 84  33 - 120 U/L Final    Total Protein 12/04/2023 7.6  6.4 - 8.2 g/dL  Final    AST 12/04/2023 19  9 - 39 U/L Final    Bilirubin, Total 12/04/2023 1.0  0.0 - 1.2 mg/dL Final    ALT 12/04/2023 22  10 - 52 U/L Final    Patients treated with Sulfasalazine may generate falsely decreased results for ALT.    Flu A Result 12/04/2023 Not Detected  Not Detected Final    Flu B Result 12/04/2023 Not Detected  Not Detected Final    Coronavirus 2019, PCR 12/04/2023 Not Detected  Not Detected Final    Troponin I, High Sensitivity 12/04/2023 3  0 - 20 ng/L Final    Troponin I, High Sensitivity 12/04/2023 3  0 - 20 ng/L Final    Hemoglobin A1C 12/04/2023 5.4  see below % Final    Estimated Average Glucose 12/04/2023 108  Not Established mg/dL Final     Narrative & Impression   Interpreted By:  Anjum Garcia,   STUDY:  XR THUMB RIGHT MIN 2 VIEWS  11/16/2024 9:02 am      INDICATION:  Signs/Symptoms:Foreign body      COMPARISON:  None.      ACCESSION NUMBER(S):  [id number]      ORDERING CLINICIAN:  AVRIL TOMAS      TECHNIQUE:  Three views of the right thumb including AP , oblique and lateral  projections were obtained.      FINDINGS:  There is no radiographic evidence of acute fracture or dislocation  identified. Mild-to-moderate hypertrophic degenerative changes are  seen in the 1st metacarpophalangeal joint. Mild degenerative changes  are seen in the trapezium 1st metacarpal articulation.      IMPRESSION:  1. No fracture or dislocation identified.  2. Degenerative changes, as described above.   tains abnormal data Prostate Specific Antigen, Screen  Order: [id number]   Status: Final result       Visible to patient: Yes (seen)       Dx: Healthcare maintenance    1 Result Note       1 Follow-up Encounter       Component  Ref Range & Units 5 d ago 4 yr ago   Prostate Specific Antigen,Screen  <=4.00 ng/mL 5.56 High  1.18 R, CM   Resulting Agency [facility]            Assessment/Plan   Problem List Items Addressed This Visit       GERD (gastroesophageal reflux disease)    Relevant Medications     cyanocobalamin, vitamin B-12, (Vitamin B-12) 2,500 mcg tablet, sublingual SL tablet    Other Relevant Orders    Vitamin B12    Elevated PSA measurement     I elevated PSA 5.56 previously 4 years ago lets do urology opinion and referral placed in the chart         Relevant Orders    Referral to Urology     Other Visit Diagnoses       Vitamin D deficiency    -  Primary    Relevant Medications    cholecalciferol (Vitamin D-3) 125 mcg (5000 UT) capsule    Other Relevant Orders    Vitamin D 25-Hydroxy,Total (for eval of Vitamin D levels)

## 2024-11-21 NOTE — PATIENT INSTRUCTIONS
GERD (gastroesophageal reflux disease)      Relevant Medications     cyanocobalamin, vitamin B-12, (Vitamin B-12) 2,500 mcg tablet, sublingual SL tablet     Other Relevant Orders     Vitamin B12     Elevated PSA measurement       I elevated PSA 5.56 previously 4 years ago lets do urology opinion and referral placed in the chart           Relevant Orders     Referral to Urology      Other Visit Diagnoses         Vitamin D deficiency    -  Primary     Relevant Medications     cholecalciferol (Vitamin D-3) 125 mcg (5000 UT) capsule     Other Relevant Orders     Vitamin D 25-Hydroxy,Total (for eval of Vitamin D leve           Please consider exercise program involving walking or some other form of aerobic activity 5 days weekly for 30 minutes... Let's also consider strengthening of large muscle groups like the abdominal muscles or shoulder muscles... Twice weekly with reps of 5/10/15 exercises and gradually increase strength.. This is not heavy strength training but light weight training... Sit ups or back exercise routine.. Please ask for handout if uncertain how to do..This  will help to strengthen your muscles which in turn will help you to lose weight.... You might ask what is the best diet available.. I would strongly encourage you to consider  Weight Watchers.. And as  your  fellow on  Weight Watchers physician attempting to  live this  LIFE  style  choice with you....  I will be glad to give you recommendations on what to eat.. Consider buying Brigette bread.., susana bagle thin bread.. oikos yogurt... eggs  to eat as hard-boiled... Halo top ice cream for snack... All these are delicious foods which.. when eaten and  being compliant eating three  meals daily  breakfast lunch and dinner, drinking  64 ounces of water daily we will all win together !!!!!!!. This will be a means for you to lose weight... Consider also the smart phone home ... My plate.. Or My  fitness  pal..,  as additional possibilities for weight  loss... Good  luck  Dr. SUKHDEEP Real!    Discussed medication side effects.  The  risk benefits and treatment options  discussed with patient.       Please schedule follow-up appointment based upon your improvement/failure to improve/chronic medical conditions and physician recommendations during office appointment at the .       Patient advised to go to er if symptoms worsen or to call answering service, or to return to office for additional evaluation    This note was partially  generated using Dragon voice recognition and there may be incorrect words, wording, spelling, or pronunciation errors that were not corrected prior to committing the note to the medical record.

## 2024-11-21 NOTE — ASSESSMENT & PLAN NOTE
I elevated PSA 5.56 previously 4 years ago lets do urology opinion and referral placed in the chart

## 2024-11-26 ENCOUNTER — APPOINTMENT (OUTPATIENT)
Dept: PHYSICAL THERAPY | Facility: CLINIC | Age: 59
End: 2024-11-26
Payer: COMMERCIAL

## 2024-11-26 DIAGNOSIS — M25.572 ACUTE LEFT ANKLE PAIN: ICD-10-CM

## 2024-11-26 DIAGNOSIS — R29.898 ANKLE WEAKNESS: ICD-10-CM

## 2024-11-26 DIAGNOSIS — S93.409D: ICD-10-CM

## 2024-11-26 PROCEDURE — 97140 MANUAL THERAPY 1/> REGIONS: CPT | Performed by: PHYSICAL THERAPIST

## 2024-11-26 PROCEDURE — 97110 THERAPEUTIC EXERCISES: CPT | Performed by: PHYSICAL THERAPIST

## 2024-11-26 ASSESSMENT — PAIN - FUNCTIONAL ASSESSMENT: PAIN_FUNCTIONAL_ASSESSMENT: 0-10

## 2024-11-26 ASSESSMENT — PAIN SCALES - GENERAL: PAINLEVEL_OUTOF10: 4

## 2024-11-26 ASSESSMENT — PAIN DESCRIPTION - DESCRIPTORS: DESCRIPTORS: BURNING

## 2024-11-26 NOTE — PROGRESS NOTES
Physical Therapy    Physical Therapy Treatment    Patient Name: Victor Manuel Sales  MRN: [id number]  Today's Date: 11/26/2024    Time Entry:   Time Calculation  Start Time: 0800  Stop Time: 0835  Time Calculation (min): 35 min     PT Therapeutic Procedures Time Entry  Manual Therapy Time Entry: 20  Therapeutic Exercise Time Entry: 15                   Assessment:   Pt reporting some improvement in his symptoms overall. He feels some of the burning still on the lateral ankle but not as intense. He does still have a hard time walking for long distances. Pt continues to feel like the dry needling is helping him.   Dry needling performed this date to gastroc/lateral ankle.  All needles removed, area inspected for adverse symptoms, none noted, patient educated in post- dry needling management and expected symptoms from treatment. All patient questions answered. Will re-assess at next appt and progress as able.    Plan:   PT recheck next session.     Current Problem  1. Acute left ankle pain  Follow Up In Physical Therapy      2. Ankle weakness  Follow Up In Physical Therapy      3. Moderate ankle sprain, unspecified laterality, subsequent encounter  Follow Up In Physical Therapy          General  PT  Visit  PT Received On: 11/26/24  General  Reason for Referral: LT ANKLE  Referred By: Dr. Real    Insurance   [employer] 60V PT OT COPAY 0 DED 0  COVERAGE 100 OOP [phone] AUTH REQ  REF# MAGDI G [id number]   Visit: 8    Subjective      Pt reports he had an instance where he felt some increase pain in the L achilles tendon last week. It only lasted a day or so but he does feel like the lateral ankle pain/burning is slowly improving. He does still feel it but not the same intensity.   He will be going to the foot doctor after our appointment today. He will hopefully getting fitted for inserts.   Precautions  N/a     Pain  Pain Assessment  Pain Assessment: 0-10  0-10 (Numeric) Pain Score: 4  Pain  "Type: Acute pain  Pain Location: Ankle  Pain Radiating Towards: lateral  Pain Descriptors: Burning    Objective     Slightly tender on lateral gastroc head today     Treatments:  Therapeutic Exercise  Therapeutic Exercise Performed: Yes  Therapeutic Exercise Activity 1: recumbent bike 6 min level 3  Therapeutic Exercise Activity 2: DL heel raise posterior tib bias 1/2 foam 4x12  Therapeutic Exercise Activity 3: tib anterior raise at wall 2x20  Therapeutic Exercise Activity 4: standing ankle DF mob on chair x20 5\"    Manual Therapy  Manual Therapy Performed: Yes  Manual Therapy Activity 1: dry needling 0.30x50 mm (7), sural, peronals proximal, lateral calcaneuous with e-stim for 5 min  Manual Therapy Activity 2: IASTM gastroc/soleus    OP EDUCATION:    The patient was educated on: the importance of positioning, proper posture, and body mechanics, joint mechanics and pathology, general tissue healing time, the appropriate use of heat and cold to control pain and inflammation, the importance of general therapeutic exercise, especially to stay within pain-free ROM, specific anatomy, function, & regional interdependence of involved areas, & likely cause of impairments & POC. Pt's questions were answered to their satisfaction, & pt. verbalized understanding & agreement with POC    Access Code: [email or web address]  URL: [email or web address]  Date: 08/09/2024  Prepared by: Alix Orozco    Goals:  Balance: Pt will demonstrate SL Balance time length = to uninvolved side. - goal partially met  Gait/Locomotion: Pt will demonstrate uncompensated and pain free ambulation and stair negotiation.- goal partially met  Pain: Pt will report 0/10 pain at rest, and <2/10 pain with activity.- making progress towards  Participation Restrictions: Pt will resume full participation in exercise activites with no reports of ankle pain.- not yet met  Range Of Motion/Joint Mobility: Pt will demonstrate L ankle AROM/PROM within 5 " deg of contralateral side- making progress towards  Strength: Pt will demonstrate ability to perform SL calf raises to fatigue = to uninvolved side.- not yet met  Pt will report LEFS score of >/= 65/80.- not yet met

## 2024-11-26 NOTE — PATIENT INSTRUCTIONS
Access Code: [email or web address]  URL: [email or web address]  Date: 11/26/2024  Prepared by: Alix Orozco    Exercises  - Towel Scrunches  - 2 x daily - 7 x weekly - 3 sets - 10 reps  - Ankle Inversion Eversion Towel Slide  - 2 x daily - 7 x weekly - 3 sets - 10 reps  - Gastroc Stretch on Wall  - 2 x daily - 7 x weekly - 3 sets - 20 hold  - Long Sitting Ankle Inversion with Resistance  - 1 x daily - 7 x weekly - 3 sets - 10 reps - 1 hold  - Long Sitting Ankle Eversion with Resistance  - 1 x daily - 7 x weekly - 3 sets - 10 reps - 1 hold  - Long Sitting Ankle Dorsiflexion with Anchored Resistance  - 1 x daily - 7 x weekly - 3 sets - 10 reps - 1 hold  - Heel Raises with Counter Support  - 1 x daily - 7 x weekly - 2 sets - 10 reps - 1 hold  - Toe Raises with Counter Support  - 1 x daily - 7 x weekly - 2 sets - 10 reps - 1 hold  - Single Leg Stance with Support  - 1 x daily - 7 x weekly - 5 sets - 10 hold  - Supine Peroneal Nerve Glide  - 1 x daily - 7 x weekly - 3 sets - 10 reps - 1 hold  - Soleus Stretch on Wall  - 1 x daily - 7 x weekly - 3 sets - 20 hold  - Single Leg Stance with Support  - 1 x daily - 7 x weekly - 3 sets - 20 hold  - Soleus Heel Raise in Stride Stance Position  - 7 x weekly - 3 sets - 10 reps - 1 hold  - Standing Ankle Dorsiflexion Stretch on Chair  - 1 x daily - 7 x weekly - 1 sets - 20 reps - 5 hold

## 2024-12-23 ENCOUNTER — APPOINTMENT (OUTPATIENT)
Dept: PHYSICAL THERAPY | Facility: CLINIC | Age: 59
End: 2024-12-23
Payer: COMMERCIAL

## 2024-12-23 DIAGNOSIS — M25.572 ACUTE LEFT ANKLE PAIN: ICD-10-CM

## 2024-12-23 DIAGNOSIS — M25.572 CHRONIC PAIN OF LEFT ANKLE: ICD-10-CM

## 2024-12-23 DIAGNOSIS — S93.409D: Primary | ICD-10-CM

## 2024-12-23 DIAGNOSIS — G57.82 SURAL NEURITIS, LEFT: ICD-10-CM

## 2024-12-23 DIAGNOSIS — G89.29 CHRONIC PAIN OF LEFT ANKLE: ICD-10-CM

## 2024-12-23 DIAGNOSIS — R29.898 ANKLE WEAKNESS: ICD-10-CM

## 2024-12-23 PROCEDURE — 97110 THERAPEUTIC EXERCISES: CPT | Performed by: PHYSICAL THERAPIST

## 2024-12-23 ASSESSMENT — PAIN - FUNCTIONAL ASSESSMENT: PAIN_FUNCTIONAL_ASSESSMENT: 0-10

## 2024-12-23 ASSESSMENT — PAIN DESCRIPTION - DESCRIPTORS: DESCRIPTORS: BURNING

## 2024-12-23 ASSESSMENT — PAIN SCALES - GENERAL: PAINLEVEL_OUTOF10: 6

## 2024-12-23 NOTE — PROGRESS NOTES
Physical Therapy    Physical Therapy Treatment    Patient Name: Victor Manuel Sales  MRN: [id number]  Today's Date: 12/23/2024    Time Entry:   Time Calculation  Start Time: 1058  Stop Time: 1140  Time Calculation (min): 42 min     PT Therapeutic Procedures Time Entry  Therapeutic Exercise Time Entry: 42                   Assessment:  PT Assessment  PT Assessment Results: Decreased strength, Decreased range of motion, Decreased endurance, Impaired balance, Decreased mobility, Pain  Rehab Prognosis: Good    Patient is a 59 year old male that presents to physical therapy re-evaluation today due to complaints of L ankle pain.  Patient continues to demonstrates a decline in their functional mobility secondary to pain, decreased ankle ROM, flexibility deficits of gastroc/soleus complex,increased neural tension in sural nerve distribution, strength deficits in foot/ankle musculature, and motor control deficits including SL stance and gait. Due to these impairments, the patients has the following functional limitations: pain with standing,  difficulty walking all distances,  and participating in all leisure activities. Pt will benefit from continued skilled therapy to address their impairments and progress towards the associated functional goals.    Plan:  OP PT Plan  Treatment/Interventions: Aquatic therapy, Biofeedback, Cryotherapy, Dry needling, Education/ Instruction, Electrical stimulation, Gait training, Hot pack, Manual therapy, Neuromuscular re-education, Self care/ home management, Taping techniques, Therapeutic activities, Ultrasound, Therapeutic exercises, Vasopneumatic device  PT Plan: Skilled PT  PT Frequency: 1 time per week  Duration: 1x a week for 6 more visits  Rehab Potential: Good  Plan of Care Agreement: Patient  -discussed seeing foot/ankle ortho specialist     Current Problem  1. Moderate ankle sprain, unspecified laterality, subsequent encounter  Follow Up In Physical Therapy    Follow Up In Physical  Therapy      2. Acute left ankle pain  Follow Up In Physical Therapy      3. Ankle weakness  Follow Up In Physical Therapy    Follow Up In Physical Therapy      4. Chronic pain of left ankle  Follow Up In Physical Therapy      5. Sural neuritis, left  Follow Up In Physical Therapy          General  PT  Visit  PT Received On: 12/23/24  General  Reason for Referral: LT ANKLE  Referred By: Dr. Real    Insurance   M[employer] 60V PT OT COPAY 0 DED 0  COVERAGE 100 OOP [phone] NO AUTH REQ  REF# MAGDI G [id number]/ALL   Visit: 10    Subjective      Pt reports he has good and bad days. He feels like there unfortunately more bad than good.     He did get his insoles fitted, he just has to pick them up still.       Pt reports pain at worst 8/10. Pain today 6/10. She continues to get burning in the lateral ankle but is also sore in his achilles tendon.     He continues to have difficulty with walking long distances.   He does notice improvement with stairs.     Precautions   N/a     Pain  Pain Assessment  Pain Assessment: 0-10  0-10 (Numeric) Pain Score: 6  Pain Type: Chronic pain  Pain Location: Ankle  Pain Radiating Towards: lateral and posterior  Pain Descriptors: Burning    Objective     Ankle ROM active:  DF:0   PF:50, cramp noted in toes   Eversion: 15!  Inversion: 40    Tenderness to palpation at base of fifth met     Mod ankle swelling around talocrural joint    Good forefoot motion    Tightness in gastroc/soleus on L     Foot/Ankle Musculoskeletal Exam    Strength    Right      Right foot/ankle strength is normal.     Left      Tibialis anterior: 5/5.       Gastroc/soleus: 5/5.       Tibialis posterior: 4+/5.       Peroneals: 4/5. Peroneals are affected by pain.     Special Tests    Left      Single heel rise: positive and with pain        Double heel rise: negative and without pain        Outcome Measures:  Other Measures  Lower Extremity Funtional Score (LEFS):  "58/80  Treatments:  Therapeutic Exercise  Therapeutic Exercise Performed: Yes  Therapeutic Exercise Activity 1: PT recheck  Therapeutic Exercise Activity 2: sural nerve glide x30  Therapeutic Exercise Activity 3: resisted ankle eversion green 3x10 slow  Therapeutic Exercise Activity 4: standing gastroc stretch 3x30\"  Therapeutic Exercise Activity 5: DL heel raise 3x10    OP EDUCATION:  Outpatient Education  Individual(s) Educated: Patient  Education Provided: Anatomy, Home Exercise Program, POC  Risk and Benefits Discussed with Patient/Caregiver/Other: yes  Patient/Caregiver Demonstrated Understanding: yes  Plan of Care Discussed and Agreed Upon: yes  Patient Response to Education: Patient/Caregiver Verbalized Understanding of Information  The patient was educated on: the importance of positioning, proper posture, and body mechanics, joint mechanics and pathology, general tissue healing time, the appropriate use of heat and cold to control pain and inflammation, the importance of general therapeutic exercise, especially to stay within pain-free ROM, specific anatomy, function, & regional interdependence of involved areas, & likely cause of impairments & POC. Pt's questions were answered to their satisfaction, & pt. verbalized understanding & agreement with POC    Access Code: [email or web address]  URL: [email or web address]  Date: 08/09/2024  Prepared by: Alix Orozco    Goals:  Balance: Pt will demonstrate SL Balance time length = to uninvolved side. - goal partially met  Gait/Locomotion: Pt will demonstrate uncompensated and pain free ambulation and stair negotiation.- goal partially met  Pain: Pt will report 0/10 pain at rest, and <2/10 pain with activity.- not yet met  Participation Restrictions: Pt will resume full participation in exercise activites with no reports of ankle pain.- not yet met  Range Of Motion/Joint Mobility: Pt will demonstrate L ankle AROM/PROM within 5 deg of contralateral " side- not yet met  Strength: Pt will demonstrate ability to perform SL calf raises to fatigue = to uninvolved side.- not yet met  Pt will report LEFS score of >/= 65/80.- not yet met

## 2024-12-31 ENCOUNTER — HOSPITAL ENCOUNTER (OUTPATIENT)
Dept: RADIOLOGY | Facility: CLINIC | Age: 59
Discharge: HOME | End: 2024-12-31
Payer: COMMERCIAL

## 2024-12-31 ENCOUNTER — APPOINTMENT (OUTPATIENT)
Dept: RADIOLOGY | Facility: CLINIC | Age: 59
End: 2024-12-31
Payer: COMMERCIAL

## 2024-12-31 ENCOUNTER — OFFICE VISIT (OUTPATIENT)
Dept: ORTHOPEDIC SURGERY | Facility: CLINIC | Age: 59
End: 2024-12-31
Payer: COMMERCIAL

## 2024-12-31 DIAGNOSIS — Q66.71 PES CAVUS OF BOTH FEET: ICD-10-CM

## 2024-12-31 DIAGNOSIS — G57.92 NEURITIS OF FOOT, LEFT: Primary | ICD-10-CM

## 2024-12-31 DIAGNOSIS — M25.572 LEFT ANKLE PAIN, UNSPECIFIED CHRONICITY: ICD-10-CM

## 2024-12-31 DIAGNOSIS — S90.32XA CONTUSION OF FOOT OR HEEL, LEFT, INITIAL ENCOUNTER: ICD-10-CM

## 2024-12-31 DIAGNOSIS — Q66.72 PES CAVUS OF BOTH FEET: ICD-10-CM

## 2024-12-31 PROCEDURE — 99214 OFFICE O/P EST MOD 30 MIN: CPT | Performed by: ORTHOPAEDIC SURGERY

## 2024-12-31 PROCEDURE — 73610 X-RAY EXAM OF ANKLE: CPT | Mod: LEFT SIDE | Performed by: RADIOLOGY

## 2024-12-31 PROCEDURE — 73610 X-RAY EXAM OF ANKLE: CPT | Mod: LT

## 2024-12-31 PROCEDURE — 99204 OFFICE O/P NEW MOD 45 MIN: CPT | Performed by: ORTHOPAEDIC SURGERY

## 2024-12-31 NOTE — PROGRESS NOTES
HISTORY OF PRESENT ILLNESS  This is a pleasant 59 y.o. year old male  who presents on 12/31/2024 at the request of Emil Real DO for evaluation of left foot/ankle burning pain that has been present over/since 6 months ago.    How the condition occurred or started: accidentally was running around at home and hit the side of his left foot/heel area on firm edge of a chair, pain and swelling right afterwards, no ankle twist and no fall  Location of pain (patient points to): burning and numbness constant over lateral foot and heel area, no sharp pain  Modifying Factors: worse at end of day but always present  Associated Signs and symptoms: burning, numbness, tingling   Previous Treatment: he has been doing physical therapy with improvement but burning pain persists, got fitted for orthotics and should be ready shortly    PHYSICAL EXAMINATION  Constitutional Exam: patient's height and weight reviewed, well-kempt  Psychiatric Exam: alert and oriented x 3, appropriate mood and behavior  Eye Exam: FARHAD, EOMI  Pulmonary Exam: breathing non-labored, no apparent distress  Lymphatic exam: no appreciable lymphadenopathy in the lower extremities  Cardiovascular exam: DP pulses 2+ bilaterally, PT 2+ bilaterally, toes are pink with good capillary refill, no pitting edema  Skin exam: no open lesions, rashes, abrasions or ulcerations  Neurological exam: sensation to light touch intact in both lower extremities in peripheral and dermatomal distributions (except for any abnormalities noted in musculoskeletal exam)    Musculoskeletal exam: left foot and ankle: cavus mild flexible, achilles intact and insertional tendinopathy noted due to posterior exostosis, numbness and tingling hypersensitivity over sural nerve distribution but no skin color changes, stable ligamentous exam, no pain to palpation over ATFL or CFL, no ankle effusion, able to tracy subtalar joint against resistance but some discomfort, no peroneal tenosynovitis  and tracking seems ok, negative DF-eversion stress test    DATA/RESULTS REVIEW: I personally reviewed the patient's x-ray images and reports of the left ankle shows posterior exostosis calcaneus related to chronic insertional tendinopathy achilles, no arthrosis, no fracture.     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  He does not have sharp pain his pain is burning in nature and is always present which is more consistent with nerve contusion injury particularly sural nerve is the distribution that is involved.  His motor strength intact on eversion and so less likely that he would have a peroneal tendon tear, though it is possible he has some tendinopathy due to his mild cavus foot over time.  We discussed with the patient the possibility of MRI but he and I decided this point to monitor since he has nerve related symptoms only.  He has no sharp pain or swelling along the peroneals.  We would recommend that he go ahead and get his orthotics and use them when ready as that we will help prevent lateral overload related to his congenital pes cavus.  Discussed with him that nerve pain is more constant burning and tingling symptoms are common and these do tend to resolve but it takes up to year and a half if a significant contusion injury occurs.  He has no symptoms concerning for CRPS at this point since he has no skin discoloration.  We would recommend continued physical therapy including desensitization therapy, avoid ice or extreme heat to the area where he feels numbness and tingling or burning as that will aggravate the nerve  "condition.  We wrote him a prescription for Neurontin gel that he will apply to the area of burning and tingling up to 3 times a day.  We also instructed him on the desensitization exercises using different materials with different textures.  Is important he does this as often as he can during the day at least 3 times a day.  We will see him back in 6 to 8 weeks to make sure he makes appropriate progress.  Physical therapy prescription updated.  The patient's questions were answered in detail.      Note dictated with Album[employer] software, completed without full type editing to avoid delay.      Dear Referring Physician,  It was my pleasure to see your patient, in the office today.  Please refer to the detailed notes above for my findings and recommendations.  Thank you once again for your consultation request.  If you have any further questions or concerns, please feel free to contact me via email or at the phone number and address below.  Sincerely,    Cielo Vitale MD   of Orthopedic Surgery, [employer]  Dual Fellowship-trained in Orthopedic Sports Medicine (Knee and Shoulder), and Foot and Ankle Surgery  The  [facility] [place]   ABOS Subspecialty Certificate in Orthopedic Sports Medicine  Head Team Physician Case \"[place]\" and [facility] \"[place]\"  [employer] OP Physician [phone] [employer]  [employer] [place] Figure Skating Medical Practitioner, including International Event Coverage  Director, Foot and Ankle Division, Department of Orthopedics    [address], [address] [address]  [email or web address]  Office [phone]    "

## 2025-01-13 ENCOUNTER — APPOINTMENT (OUTPATIENT)
Dept: PHYSICAL THERAPY | Facility: CLINIC | Age: 60
End: 2025-01-13
Payer: COMMERCIAL

## 2025-01-13 DIAGNOSIS — S93.409D: ICD-10-CM

## 2025-01-13 DIAGNOSIS — R29.898 ANKLE WEAKNESS: ICD-10-CM

## 2025-01-13 DIAGNOSIS — G57.82 SURAL NEURITIS, LEFT: ICD-10-CM

## 2025-01-13 DIAGNOSIS — M25.572 CHRONIC PAIN OF LEFT ANKLE: ICD-10-CM

## 2025-01-13 DIAGNOSIS — G89.29 CHRONIC PAIN OF LEFT ANKLE: ICD-10-CM

## 2025-01-13 PROCEDURE — 97014 ELECTRIC STIMULATION THERAPY: CPT | Performed by: PHYSICAL THERAPIST

## 2025-01-13 PROCEDURE — 97110 THERAPEUTIC EXERCISES: CPT | Performed by: PHYSICAL THERAPIST

## 2025-01-13 ASSESSMENT — PAIN - FUNCTIONAL ASSESSMENT: PAIN_FUNCTIONAL_ASSESSMENT: 0-10

## 2025-01-13 ASSESSMENT — PAIN SCALES - GENERAL: PAINLEVEL_OUTOF10: 3

## 2025-01-13 ASSESSMENT — PAIN DESCRIPTION - DESCRIPTORS: DESCRIPTORS: BURNING

## 2025-01-13 NOTE — PROGRESS NOTES
Physical Therapy    Physical Therapy Treatment    Patient Name: Victor Manuel Sales  MRN: [id number]  Today's Date: 1/13/2025    Time Entry:   Time Calculation  Start Time: 0715  Stop Time: 0805  Time Calculation (min): 50 min     PT Therapeutic Procedures Time Entry  Therapeutic Exercise Time Entry: 42  PT Modalities Time Entry  E-Stim (Unattended) Time Entry: 8                Assessment:   Pt reporting continued nerve symptoms on L lateral ankle under malleolus. Diagnosed with sural neuritis. He did get his new insoles which have been helping thus far. He will also be trying a new nerve cream this week when he picks it up. Demonstrated some desensitization techniques this date and some toe yoga. He tolerated well but was challenged with raising big toe. Session ended with NMES to lateral ankle today per the patients request.     Plan:   Plan to continue to work on progressing towards established rehab goals as per patient tolerance.       Current Problem  1. Ankle weakness  Follow Up In Physical Therapy      2. Moderate ankle sprain, unspecified laterality, subsequent encounter  Follow Up In Physical Therapy      3. Chronic pain of left ankle  Follow Up In Physical Therapy      4. Sural neuritis, left  Follow Up In Physical Therapy          General  PT  Visit  PT Received On: 01/13/25  General  Reason for Referral: LT ANKLE  Referred By: Dr. Real    Insurance   MMO [employer] 60V PT OT COPAY 0 DED 0  COVERAGE 100 OOP [phone] NO AUTH REQ  REF# MAGDI G [id number]/ALL   Visit: 12    Subjective      Pt reports he saw Dr. Vitale. She provided him a a topical script. It just came in and he will pick it up this week. He will follow up with her again in 6 weeks.   Pt reports he also got his insoles from podiatry. He has been wearing them for the last two weeks.       Precautions  N/a     Pain  Pain Assessment  Pain Assessment: 0-10  0-10 (Numeric) Pain Score: 3  Pain Type: Chronic pain  Pain  "Location: Ankle  Pain Radiating Towards: lateral ankle and posterior  Pain Descriptors: Burning    Objective     Moderate ankle swelling still present     Treatments:  Therapeutic Exercise  Therapeutic Exercise Performed: Yes  Therapeutic Exercise Activity 1: recumbent  bike 6 min level 3  Therapeutic Exercise Activity 2: ankle nerve desensitization education  Therapeutic Exercise Activity 3: towel curls seated x30  Therapeutic Exercise Activity 4: toe yoga (big toe raise/ small toe raise) x10 each  Therapeutic Exercise Activity 5: resisted ankle inversion/eversion blue 3x10 each slow  Therapeutic Exercise Activity 6: SL balance black pad x4 20\"  Therapeutic Exercise Activity 7: gastroc stretch standing 3x30\"  Therapeutic Exercise Activity 8: soleus stretch 3 x30\"    Modalities  Modalities Used: Yes  Modality 1: Untimed ESU - Electrical Stimulation Unattended (lateral peronals 8 min)    OP EDUCATION:   The patient was educated on: the importance of positioning, proper posture, and body mechanics, joint mechanics and pathology, general tissue healing time, the appropriate use of heat and cold to control pain and inflammation, the importance of general therapeutic exercise, especially to stay within pain-free ROM, specific anatomy, function, & regional interdependence of involved areas, & likely cause of impairments & POC. Pt's questions were answered to their satisfaction, & pt. verbalized understanding & agreement with POC    Access Code: [email or web address]  URL: [email or web address]  Date: 08/09/2024  Prepared by: Alix Orozco    Goals:  Balance: Pt will demonstrate SL Balance time length = to uninvolved side. - goal partially met  Gait/Locomotion: Pt will demonstrate uncompensated and pain free ambulation and stair negotiation.- goal partially met  Pain: Pt will report 0/10 pain at rest, and <2/10 pain with activity.- not yet met  Participation Restrictions: Pt will resume full participation in " exercise activites with no reports of ankle pain.- not yet met  Range Of Motion/Joint Mobility: Pt will demonstrate L ankle AROM/PROM within 5 deg of contralateral side- not yet met  Strength: Pt will demonstrate ability to perform SL calf raises to fatigue = to uninvolved side.- not yet met  Pt will report LEFS score of >/= 65/80.- not yet met

## 2025-01-20 ENCOUNTER — APPOINTMENT (OUTPATIENT)
Dept: PHYSICAL THERAPY | Facility: CLINIC | Age: 60
End: 2025-01-20
Payer: COMMERCIAL

## 2025-01-27 ENCOUNTER — APPOINTMENT (OUTPATIENT)
Dept: PHYSICAL THERAPY | Facility: CLINIC | Age: 60
End: 2025-01-27
Payer: COMMERCIAL

## 2025-01-27 DIAGNOSIS — M25.572 CHRONIC PAIN OF LEFT ANKLE: ICD-10-CM

## 2025-01-27 DIAGNOSIS — R29.898 ANKLE WEAKNESS: ICD-10-CM

## 2025-01-27 DIAGNOSIS — G89.29 CHRONIC PAIN OF LEFT ANKLE: ICD-10-CM

## 2025-01-27 DIAGNOSIS — G57.82 SURAL NEURITIS, LEFT: ICD-10-CM

## 2025-01-27 DIAGNOSIS — S93.409D: ICD-10-CM

## 2025-01-27 PROCEDURE — 97110 THERAPEUTIC EXERCISES: CPT | Performed by: PHYSICAL THERAPIST

## 2025-01-27 ASSESSMENT — PAIN - FUNCTIONAL ASSESSMENT: PAIN_FUNCTIONAL_ASSESSMENT: 0-10

## 2025-01-27 ASSESSMENT — PAIN SCALES - GENERAL: PAINLEVEL_OUTOF10: 1

## 2025-01-27 ASSESSMENT — PAIN DESCRIPTION - DESCRIPTORS: DESCRIPTORS: NUMBNESS

## 2025-01-27 NOTE — PATIENT INSTRUCTIONS
Access Code: [email or web address]  URL: [email or web address]  Date: 01/27/2025  Prepared by: Alix Orozco    Exercises  - Towel Scrunches  - 2 x daily - 7 x weekly - 3 sets - 10 reps  - Ankle Inversion Eversion Towel Slide  - 2 x daily - 7 x weekly - 3 sets - 10 reps  - Long Sitting Ankle Inversion with Resistance  - 1 x daily - 7 x weekly - 3 sets - 10 reps - 1 hold  - Long Sitting Ankle Eversion with Resistance  - 1 x daily - 7 x weekly - 3 sets - 10 reps - 1 hold  - Long Sitting Ankle Dorsiflexion with Anchored Resistance  - 1 x daily - 7 x weekly - 3 sets - 10 reps - 1 hold  - Heel Raises with Counter Support  - 1 x daily - 7 x weekly - 2 sets - 10 reps - 1 hold  - Toe Raises with Counter Support  - 1 x daily - 7 x weekly - 2 sets - 10 reps - 1 hold  - Single Leg Stance with Support  - 1 x daily - 7 x weekly - 5 sets - 10 hold  - Supine Peroneal Nerve Glide  - 1 x daily - 7 x weekly - 3 sets - 10 reps - 1 hold  - Single Leg Stance with Support  - 1 x daily - 7 x weekly - 3 sets - 20 hold  - Soleus Heel Raise in Stride Stance Position  - 7 x weekly - 3 sets - 10 reps - 1 hold  - Standing Ankle Dorsiflexion Stretch on Chair  - 1 x daily - 7 x weekly - 1 sets - 20 reps - 5 hold  - Gastroc Stretch on Wall  - 2 x daily - 7 x weekly - 3 sets - 30 hold  - Soleus Stretch on Wall  - 2 x daily - 7 x weekly - 3 sets - 30 hold  - Single Leg Balance with Four Way Reach and Rotation  - 1 x daily - 4 x weekly - 1 sets - 10 reps

## 2025-01-27 NOTE — PROGRESS NOTES
Physical Therapy    Physical Therapy Treatment    Patient Name: Victor Manuel Sales  MRN: [id number]  Today's Date: 1/27/2025    Time Entry:   Time Calculation  Start Time: 0712  Stop Time: 0753  Time Calculation (min): 41 min     PT Therapeutic Procedures Time Entry  Therapeutic Exercise Time Entry: 41                   Assessment:   Pt reporting some improvement in ankle symptoms since last session. He does continue to feel some numbness on the outside of the ankle but not as severe today. He did well with balance progression today on black therapad. Introduced 3 way balance taps and he tolerated well with some fatigue post. Provided him an updated handout and reviewed in depth.     Plan:   Plan to continue to work on progressing towards established rehab goals as per patient tolerance. (Add resisted side steps with band at toes next session)      Current Problem  1. Ankle weakness  Follow Up In Physical Therapy      2. Moderate ankle sprain, unspecified laterality, subsequent encounter  Follow Up In Physical Therapy      3. Chronic pain of left ankle  Follow Up In Physical Therapy      4. Sural neuritis, left  Follow Up In Physical Therapy          General  PT  Visit  PT Received On: 01/27/25  General  Reason for Referral: LT ANKLE  Referred By: Dr. Real    Insurance   MMO SU[employer] 60V PT OT COPAY 0 DED 0  COVERAGE 100 OOP [phone] NO AUTH REQ  REF# MAGDI G [id number]/ALL  Visit: 13  Subjective      Pt reports he has been doing a little bit better. He did try the densensitization techniques we discussed last session and he feels those are going well. He also feels like the insoles have made a big difference. He can still feel the numbness on the outside of the ankle but not too painful this morning.     Precautions  N/a     Pain  Pain Assessment  Pain Assessment: 0-10  0-10 (Numeric) Pain Score: 1  Pain Type: Chronic pain  Pain Location: Ankle  Pain Radiating Towards: lateral ankle  Pain  "Descriptors: Numbness    Objective   Improved control with SL balance today     Treatments:  Therapeutic Exercise  Therapeutic Exercise Performed: Yes  Therapeutic Exercise Activity 1: recumbent bike 6 min level 3  Therapeutic Exercise Activity 2: gastroc stretch 3x20\"  Therapeutic Exercise Activity 3: soleus stretch 3x20\"  Therapeutic Exercise Activity 4: DL heel raise eccentric 1/2 foam 3x10  Therapeutic Exercise Activity 5: toe raise 1/2 foam 3x10  Therapeutic Exercise Activity 6: SL balance blue therapad x4 20\"  Therapeutic Exercise Activity 7: SL 3 way tap x10 light rail support    OP EDUCATION:    The patient was educated on: the importance of positioning, proper posture, and body mechanics, joint mechanics and pathology, general tissue healing time, the appropriate use of heat and cold to control pain and inflammation, the importance of general therapeutic exercise, especially to stay within pain-free ROM, specific anatomy, function, & regional interdependence of involved areas, & likely cause of impairments & POC. Pt's questions were answered to their satisfaction, & pt. verbalized understanding & agreement with POC    Access Code: [email or web address]  URL: [email or web address]  Date: 08/09/2024  Prepared by: Alix Orozco    Goals:  Balance: Pt will demonstrate SL Balance time length = to uninvolved side. - goal partially met  Gait/Locomotion: Pt will demonstrate uncompensated and pain free ambulation and stair negotiation.- goal partially met  Pain: Pt will report 0/10 pain at rest, and <2/10 pain with activity.- not yet met  Participation Restrictions: Pt will resume full participation in exercise activites with no reports of ankle pain.- not yet met  Range Of Motion/Joint Mobility: Pt will demonstrate L ankle AROM/PROM within 5 deg of contralateral side- not yet met  Strength: Pt will demonstrate ability to perform SL calf raises to fatigue = to uninvolved side.- not yet met  Pt will " report LEFS score of >/= 65/80.- not yet met

## 2025-02-03 ENCOUNTER — APPOINTMENT (OUTPATIENT)
Dept: PHYSICAL THERAPY | Facility: CLINIC | Age: 60
End: 2025-02-03
Payer: COMMERCIAL

## 2025-02-03 DIAGNOSIS — R29.898 ANKLE WEAKNESS: ICD-10-CM

## 2025-02-03 DIAGNOSIS — S93.409D: ICD-10-CM

## 2025-02-03 DIAGNOSIS — G57.82 SURAL NEURITIS, LEFT: ICD-10-CM

## 2025-02-03 DIAGNOSIS — M25.572 CHRONIC PAIN OF LEFT ANKLE: ICD-10-CM

## 2025-02-03 DIAGNOSIS — G89.29 CHRONIC PAIN OF LEFT ANKLE: ICD-10-CM

## 2025-02-03 PROCEDURE — 97110 THERAPEUTIC EXERCISES: CPT | Performed by: PHYSICAL THERAPIST

## 2025-02-03 ASSESSMENT — PAIN - FUNCTIONAL ASSESSMENT: PAIN_FUNCTIONAL_ASSESSMENT: 0-10

## 2025-02-03 ASSESSMENT — PAIN DESCRIPTION - DESCRIPTORS: DESCRIPTORS: NUMBNESS

## 2025-02-03 ASSESSMENT — PAIN SCALES - GENERAL: PAINLEVEL_OUTOF10: 1

## 2025-02-03 NOTE — PROGRESS NOTES
Physical Therapy    Physical Therapy Treatment    Patient Name: Victor Manuel Sales  MRN: [id number]  Today's Date: 2/3/2025    Time Entry:   Time Calculation  Start Time: 0715  Stop Time: 0749  Time Calculation (min): 34 min     PT Therapeutic Procedures Time Entry  Therapeutic Exercise Time Entry: 34                   Assessment:   Pt reports he can continue to feel the nerve pain in the posterior and lateral ankle. He was challenged with addition of SL woodpeckers this date. Advised him to work the repetitions as he tolerates. Provided him an updated handout and reviewed in depth.     Plan:   Plan to continue to work on progressing towards established rehab goals as per patient tolerance.       Current Problem  1. Ankle weakness  Follow Up In Physical Therapy      2. Moderate ankle sprain, unspecified laterality, subsequent encounter  Follow Up In Physical Therapy      3. Chronic pain of left ankle  Follow Up In Physical Therapy      4. Sural neuritis, left  Follow Up In Physical Therapy          General  PT  Visit  PT Received On: 02/03/25  General  Reason for Referral: LT ANKLE  Referred By: Dr. Church    Insurance   [employer] 60V PT OT COPAY 0 DED 0  COVERAGE 100 OOP [phone] NO AUTH REQ  REF# MAGDI G [id number]   Visit: 12    Subjective      Pt reports he can feel the nerve pain in the ankle. Still in the lateral ankle and posterior in achilles tendon region. He does feel like the meloxicam does help but he does not like taking it.     Precautions  N/a     Pain  Pain Assessment  Pain Assessment: 0-10  0-10 (Numeric) Pain Score: 1  Pain Type: Chronic pain  Pain Location: Ankle  Pain Radiating Towards: lateral ankle and posterior to achilles  Pain Descriptors: Numbness    Objective       Treatments:  Therapeutic Exercise  Therapeutic Exercise Performed: Yes  Therapeutic Exercise Activity 1: recumbent bike 6 min level 3  Therapeutic Exercise Activity 2: gastroc stretch standing  "3x20\"  Therapeutic Exercise Activity 3: soleus stretch 3x20\" standing  Therapeutic Exercise Activity 4: SL balance black therapad x4 20\"  Therapeutic Exercise Activity 5: DL heel raise ball squeeze at heels 1/2 foam 3x10  Therapeutic Exercise Activity 6: soleus raise split stance 3x10  Therapeutic Exercise Activity 7: woodpeckers 2x10      OP EDUCATION:    The patient was educated on: the importance of positioning, proper posture, and body mechanics, joint mechanics and pathology, general tissue healing time, the appropriate use of heat and cold to control pain and inflammation, the importance of general therapeutic exercise, especially to stay within pain-free ROM, specific anatomy, function, & regional interdependence of involved areas, & likely cause of impairments & POC. Pt's questions were answered to their satisfaction, & pt. verbalized understanding & agreement with POC    Access Code: [email or web address]  URL: [email or web address]  Date: 08/09/2024  Prepared by: Alix Orozco    Goals:  Balance: Pt will demonstrate SL Balance time length = to uninvolved side. - goal partially met  Gait/Locomotion: Pt will demonstrate uncompensated and pain free ambulation and stair negotiation.- goal partially met  Pain: Pt will report 0/10 pain at rest, and <2/10 pain with activity.- not yet met  Participation Restrictions: Pt will resume full participation in exercise activites with no reports of ankle pain.- not yet met  Range Of Motion/Joint Mobility: Pt will demonstrate L ankle AROM/PROM within 5 deg of contralateral side- not yet met  Strength: Pt will demonstrate ability to perform SL calf raises to fatigue = to uninvolved side.- not yet met  Pt will report LEFS score of >/= 65/80.- not yet met    "

## 2025-02-10 ENCOUNTER — APPOINTMENT (OUTPATIENT)
Dept: PHYSICAL THERAPY | Facility: CLINIC | Age: 60
End: 2025-02-10
Payer: COMMERCIAL

## 2025-02-13 LAB
25(OH)D3+25(OH)D2 SERPL-MCNC: 40 NG/ML (ref 30–100)
VIT B12 SERPL-MCNC: 1659 PG/ML (ref 200–1100)

## 2025-02-17 ENCOUNTER — APPOINTMENT (OUTPATIENT)
Dept: PHYSICAL THERAPY | Facility: CLINIC | Age: 60
End: 2025-02-17
Payer: COMMERCIAL

## 2025-02-18 ENCOUNTER — APPOINTMENT (OUTPATIENT)
Dept: PRIMARY CARE | Facility: CLINIC | Age: 60
End: 2025-02-18
Payer: COMMERCIAL

## 2025-02-18 DIAGNOSIS — R97.20 ELEVATED PSA: ICD-10-CM

## 2025-02-18 DIAGNOSIS — K21.9 GASTROESOPHAGEAL REFLUX DISEASE WITHOUT ESOPHAGITIS: Primary | ICD-10-CM

## 2025-02-18 DIAGNOSIS — E55.9 VITAMIN D DEFICIENCY: ICD-10-CM

## 2025-02-18 PROCEDURE — 99213 OFFICE O/P EST LOW 20 MIN: CPT | Performed by: FAMILY MEDICINE

## 2025-02-18 ASSESSMENT — ENCOUNTER SYMPTOMS
DYSURIA: 0
DIFFICULTY URINATING: 0

## 2025-02-18 NOTE — PROGRESS NOTES
"Subjective   Patient ID: Victor Manuel Sales \"Alex\" is a 59 y.o. male who presents for No chief complaint on file..  HPI.  This visit was completed via virtual  visit...due to the restrictions of patients schedule. All issues as below were discussed and addressed, but physical examination was limited to visual inspection only and was performed.. IN THIS restricted fashion. iF It was felt that the patient should be evaluated in clinic then  .they were directed there. The patient verbally consented to visit.     Elevated   psa        B12  now higher         Vitamin d  is  better    No h/o  vasectomy  ..  Negative fh  of prostatecancer  Review of Systems   Genitourinary:  Negative for decreased urine volume, difficulty urinating and dysuria.       Objective   Physical Exam  Physical examination the visual auditory observations  Vital signs none provided by patient  General no acute distress alert and oriented  Head and neck no obvious mass or deformity appreciated no obvious xanthelasma  Lungs no obvious respiratory distress. No audible wheezes noted  Abdomen.. no Obesity appreciated  Extremities no visual  abnormalties appreciated noted  Neuro. No visually apparent deficits  Psychiatric. Well with normal mood   Labs     Contains abnormal data Vitamin D 25-Hydroxy,Total (for eval of Vitamin D levels)  Order: [id number]          Component  Ref Range & Units 3 mo ago   Vitamin D, 25-Hydroxy, Total  30 - 100 ng/mL 14 Low    Resulting Agency [facility]              Narrative  Performed by: [facility]  Deficiency:         < 20   ng/ml  Insufficiency:      20-29  ng/ml  Sufficiency:         ng/ml  This assay accurately quantifies the sum of Vitamin D3, 25-Hydroxy and Vitamin D2,25-Hydroxy.   Specimen Collected: 11/16/24 09:08 Last Resulted: 11/16/24 18:01               Assessment/Plan   Problem List Items Addressed This Visit       GERD (gastroesophageal reflux disease) - Primary     Recommendation is to continue treatment for reflux " esophagitis.  If occasionally you want to stop the medication and see if GERD is gone by all means do that but if you start develop or have symptoms then repeat restart medicine.  Watch for difficulty swallowing due to swelling worsens.  If taking PPI then continue to take B12 consistently         Elevated PSA     Differential for elevated PSA includes enlargement BPH and sounds like he has symptoms versus infection versus prostate cancer.  Lets get opinion and evaluation by urology and may need to be on meds to help with symptoms         Vitamin D deficiency      vitamin D from 14-40 with 5000 international unit daily.  Encouraged him to continue 5000 daily during the winter months and may Kathryn discontinued just take regular over-the-counter vitamin and restart September October with repeat vitamin D 6 months         Relevant Orders    Vitamin D 25-Hydroxy,Total (for eval of Vitamin D levels)

## 2025-02-18 NOTE — ASSESSMENT & PLAN NOTE
Recommendation is to continue treatment for reflux esophagitis.  If occasionally you want to stop the medication and see if GERD is gone by all means do that but if you start develop or have symptoms then repeat restart medicine.  Watch for difficulty swallowing due to swelling worsens.  If taking PPI then continue to take B12 consistently

## 2025-02-18 NOTE — ASSESSMENT & PLAN NOTE
Differential for elevated PSA includes enlargement BPH and sounds like he has symptoms versus infection versus prostate cancer.  Lets get opinion and evaluation by urology and may need to be on meds to help with symptoms

## 2025-02-18 NOTE — ASSESSMENT & PLAN NOTE
vitamin D from 14-40 with 5000 international unit daily.  Encouraged him to continue 5000 daily during the winter months and may Kathryn discontinued just take regular over-the-counter vitamin and restart September October with repeat vitamin D 6 months

## 2025-03-03 ENCOUNTER — APPOINTMENT (OUTPATIENT)
Dept: UROLOGY | Facility: CLINIC | Age: 60
End: 2025-03-03
Payer: COMMERCIAL

## 2025-03-03 VITALS — HEART RATE: 77 BPM | DIASTOLIC BLOOD PRESSURE: 78 MMHG | TEMPERATURE: 96 F | SYSTOLIC BLOOD PRESSURE: 130 MMHG

## 2025-03-03 DIAGNOSIS — N40.1 BPH WITH LOWER URINARY TRACT SYMPTOMS WITHOUT URINARY OBSTRUCTION: Primary | ICD-10-CM

## 2025-03-03 DIAGNOSIS — R97.20 ELEVATED PSA MEASUREMENT: ICD-10-CM

## 2025-03-03 PROCEDURE — G2211 COMPLEX E/M VISIT ADD ON: HCPCS | Performed by: UROLOGY

## 2025-03-03 PROCEDURE — 1036F TOBACCO NON-USER: CPT | Performed by: UROLOGY

## 2025-03-03 PROCEDURE — 99204 OFFICE O/P NEW MOD 45 MIN: CPT | Performed by: UROLOGY

## 2025-03-03 RX ORDER — TAMSULOSIN HYDROCHLORIDE 0.4 MG/1
0.4 CAPSULE ORAL DAILY
Qty: 90 CAPSULE | Refills: 3 | Status: SHIPPED | OUTPATIENT
Start: 2025-03-03 | End: 2026-03-03

## 2025-03-03 NOTE — PROGRESS NOTES
"CHIEF COMPLAINT:  Presents to the office today to discuss:  1. Elevated PSA  2. Lower urinary tract symptoms    HPI TODAY: 03/03/2025:  PSA elevated to 5.56 on 11/16/2024, increased from 1.18 four years ago. Reports weak urinary stream with need to push to void completely, particularly noticeable after bowel movements. Denies urgency or difficulty holding urine. Reports decreased ejaculatory volume with forced ejaculation. Denies any midstream cutoff.    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    PHYSICAL EXAMINATION:  Gen: NAD  Pulm: No increased WOB on RA  Cards: WWP  : Digital rectal examination reveals enlarged prostate with induration noted on left side    PSA History:  - 11/16/2024: 5.56  - 4 years ago: 1.18    ASSESSMENT/PLAN:    Elevated PSA (R97.2)  - Assessment: PSA elevated to 5.56, increased from baseline of 1.18 four years ago. Enlarged prostate on examination with firmness noted on left side.  - Plan: Will repeat PSA in one week. Patient counseled to avoid ejaculation and straining with bowel movements for 24 hours prior to blood draw. Based on results, will likely proceed with prostate MRI followed by possible biopsy. Follow-up scheduled in a few months to review results.  - Counseling: Risks, benefits, and alternatives of prostate biopsy were discussed including but not limited to hematuria, hematospermia, infection, and pain.    Benign " Prostatic Hyperplasia with Lower Urinary Tract Symptoms (N40.1)  - Assessment: Moderate symptoms with AUA score of 12, primarily weak stream and incomplete emptying.  - Plan: Starting tamsulosin 0.4mg nightly. Counseled on possible side effects including orthostatic hypotension and decreased ejaculatory volume.    The patient provided verbal consent for the audio recording of today's encounter using AI.

## 2025-03-10 DIAGNOSIS — R97.20 ELEVATED PSA MEASUREMENT: ICD-10-CM

## 2025-03-12 DIAGNOSIS — R97.20 ELEVATED PSA MEASUREMENT: Primary | ICD-10-CM

## 2025-03-12 LAB
PSA FREE MFR SERPL: 17 % (CALC)
PSA FREE SERPL-MCNC: 0.7 NG/ML
PSA SERPL-MCNC: 4.2 NG/ML

## 2025-03-26 ENCOUNTER — HOSPITAL ENCOUNTER (OUTPATIENT)
Dept: RADIOLOGY | Facility: HOSPITAL | Age: 60
Discharge: HOME | End: 2025-03-26
Payer: COMMERCIAL

## 2025-03-26 DIAGNOSIS — R97.20 ELEVATED PSA MEASUREMENT: ICD-10-CM

## 2025-03-26 PROCEDURE — 72197 MRI PELVIS W/O & W/DYE: CPT

## 2025-03-26 PROCEDURE — A9575 INJ GADOTERATE MEGLUMI 0.1ML: HCPCS | Performed by: UROLOGY

## 2025-03-26 PROCEDURE — 2550000001 HC RX 255 CONTRASTS: Performed by: UROLOGY

## 2025-03-26 RX ORDER — GADOTERATE MEGLUMINE 376.9 MG/ML
0.2 INJECTION INTRAVENOUS
Status: COMPLETED | OUTPATIENT
Start: 2025-03-26 | End: 2025-03-26

## 2025-03-26 RX ADMIN — GADOTERATE MEGLUMINE 19 ML: 376.9 INJECTION INTRAVENOUS at 14:41

## 2025-05-13 ENCOUNTER — APPOINTMENT (OUTPATIENT)
Dept: PRIMARY CARE | Facility: CLINIC | Age: 60
End: 2025-05-13
Payer: COMMERCIAL

## 2025-05-13 VITALS
HEIGHT: 70 IN | SYSTOLIC BLOOD PRESSURE: 116 MMHG | OXYGEN SATURATION: 95 % | DIASTOLIC BLOOD PRESSURE: 81 MMHG | BODY MASS INDEX: 30.49 KG/M2 | RESPIRATION RATE: 18 BRPM | WEIGHT: 213 LBS | HEART RATE: 78 BPM | TEMPERATURE: 97 F

## 2025-05-13 DIAGNOSIS — J30.2 SEASONAL ALLERGIES: Primary | ICD-10-CM

## 2025-05-13 PROCEDURE — 1036F TOBACCO NON-USER: CPT | Performed by: FAMILY MEDICINE

## 2025-05-13 PROCEDURE — 99213 OFFICE O/P EST LOW 20 MIN: CPT | Performed by: FAMILY MEDICINE

## 2025-05-13 PROCEDURE — 3008F BODY MASS INDEX DOCD: CPT | Performed by: FAMILY MEDICINE

## 2025-05-13 RX ORDER — METHYLPREDNISOLONE 4 MG/1
TABLET ORAL
Qty: 21 TABLET | Refills: 0 | Status: SHIPPED | OUTPATIENT
Start: 2025-05-13

## 2025-05-13 RX ORDER — LEVOCETIRIZINE DIHYDROCHLORIDE 5 MG/1
5 TABLET, FILM COATED ORAL EVERY EVENING
Qty: 30 TABLET | Refills: 11 | Status: SHIPPED | OUTPATIENT
Start: 2025-05-13 | End: 2026-05-13

## 2025-05-13 RX ORDER — FLUTICASONE PROPIONATE 50 MCG
2 SPRAY, SUSPENSION (ML) NASAL DAILY
Qty: 16 G | Refills: 11 | Status: SHIPPED | OUTPATIENT
Start: 2025-05-13 | End: 2026-05-13

## 2025-05-13 ASSESSMENT — ENCOUNTER SYMPTOMS
SINUS PRESSURE: 1
RHINORRHEA: 1
SINUS PAIN: 0
NECK PAIN: 1

## 2025-05-13 ASSESSMENT — PATIENT HEALTH QUESTIONNAIRE - PHQ9
2. FEELING DOWN, DEPRESSED OR HOPELESS: NOT AT ALL
SUM OF ALL RESPONSES TO PHQ9 QUESTIONS 1 AND 2: 0
1. LITTLE INTEREST OR PLEASURE IN DOING THINGS: NOT AT ALL

## 2025-05-13 NOTE — PROGRESS NOTES
Answers submitted by the patient for this visit:  Ear Pain Questionnaire (Submitted on 5/13/2025)  Chief Complaint: Ear pain  Affected ear: both  Chronicity: new  Onset: 1 to 4 weeks ago  Progression since onset: waxing and waning  Frequency: constantly  Fever: no fever  Pain - numeric: 4/10  neck pain: Yes

## 2025-05-13 NOTE — ASSESSMENT & PLAN NOTE
Exam compatible with allergic rhinitis with secondary ear manifestation of tenderness lets try both Medrol Dosepak and nasal steroids with Xyzal at bedtime hopefully this will lemonade all symptoms

## 2025-05-13 NOTE — PROGRESS NOTES
"Subjective   Patient ID: Victor Manuel Sales \"Alex\" is a 59 y.o. male who presents for Tinnitus.  Earache   There is pain in both ears. This is a new problem. The current episode started 1 to 4 weeks ago. The problem occurs constantly. The problem has been waxing and waning. There has been no fever. The pain is at a severity of 4/10. Associated symptoms include neck pain and rhinorrhea. There is no history of hearing loss.   Sudafed helped with relief of symptoms    Review of Systems   HENT:  Positive for ear pain, rhinorrhea, sinus pressure and sneezing. Negative for sinus pain.    Musculoskeletal:  Positive for neck pain.       Objective   Physical Exam  Vitals: I have reviewed the vitals  General: Well-developed.  In no acute distress.  Eyes:   sclera nonicteric.  Conjunctiva not injected.  No discharge. Eyes with  allergic shiners   HEAD: Normocephalic, atraumatic.  HEENT   Mucous membranes moist.  Posterior oropharynx nonerythematous, no tonsillar exudates.    Ear left  with fluid    No cervical lymphadenopathy.  Cardio: Regular rate and rhythm.  No murmur, rub or gallop.  Pulmonary: Lungs clear to auscultation in all fields.  No accessory muscle use.  GI/: Normal active bowel sounds.  Soft, nontender.  No masses or organomegaly appreciated.  Musculoskeletal: No gross deformities appreciated.  Neuro: Alert, age-appropriate.  Normal muscle tone.  Moving all extremities.  Skin: No rash, bruises or lesions.   Labs        Assessment/Plan   Problem List Items Addressed This Visit       Seasonal allergies - Primary    Exam compatible with allergic rhinitis with secondary ear manifestation of tenderness lets try both Medrol Dosepak and nasal steroids with Xyzal at bedtime hopefully this will lemonade all symptoms         Relevant Medications    methylPREDNISolone (Medrol Dospak) 4 mg tablets    fluticasone (Flonase) 50 mcg/actuation nasal spray    levocetirizine (Xyzal) 5 mg tablet                 "

## 2025-06-09 ENCOUNTER — APPOINTMENT (OUTPATIENT)
Dept: UROLOGY | Facility: CLINIC | Age: 60
End: 2025-06-09
Payer: COMMERCIAL

## 2025-08-04 ENCOUNTER — APPOINTMENT (OUTPATIENT)
Dept: DERMATOLOGY | Facility: CLINIC | Age: 60
End: 2025-08-04
Payer: COMMERCIAL

## 2025-08-05 ENCOUNTER — OFFICE VISIT (OUTPATIENT)
Dept: DERMATOLOGY | Facility: CLINIC | Age: 60
End: 2025-08-05
Payer: COMMERCIAL

## 2025-08-05 ENCOUNTER — APPOINTMENT (OUTPATIENT)
Dept: DERMATOLOGY | Facility: CLINIC | Age: 60
End: 2025-08-05
Payer: COMMERCIAL

## 2025-08-05 DIAGNOSIS — D48.5 NEOPLASM OF UNCERTAIN BEHAVIOR OF SKIN: Primary | ICD-10-CM

## 2025-08-05 DIAGNOSIS — L73.9 FOLLICULITIS: ICD-10-CM

## 2025-08-05 DIAGNOSIS — L28.0 LICHEN SIMPLEX CHRONICUS: ICD-10-CM

## 2025-08-05 PROCEDURE — 99203 OFFICE O/P NEW LOW 30 MIN: CPT | Performed by: NURSE PRACTITIONER

## 2025-08-05 PROCEDURE — 11102 TANGNTL BX SKIN SINGLE LES: CPT | Performed by: NURSE PRACTITIONER

## 2025-08-05 RX ORDER — CLOBETASOL PROPIONATE 0.5 MG/G
CREAM TOPICAL 2 TIMES DAILY
Qty: 60 G | Refills: 3 | Status: SHIPPED | OUTPATIENT
Start: 2025-08-05 | End: 2025-09-05

## 2025-08-05 RX ORDER — CLINDAMYCIN PHOSPHATE 11.9 MG/ML
SOLUTION TOPICAL
Qty: 60 ML | Refills: 2 | Status: SHIPPED | OUTPATIENT
Start: 2025-08-05

## 2025-08-05 NOTE — Clinical Note
-Discussed nature of condition  -Discussed treatment options  -This condition is often worsened by heat exposure, sweat exposure, friction/pressure on the skin. Off-load pressure as possible and wear loose, breathable clothing.  -Recommend:

## 2025-08-05 NOTE — Clinical Note
-Discussed nature of diagnosis  -Recommend discontinuation of pressure or manipulation of the area  -Topical steroids may help to thin out areas of thickened skin  -Discussed with/information given to the patient on the risks, benefits and alternatives of the usage of topical corticosteroids, including but not limited to: atrophy (thinning of the skin), striae (stretch marks), telangiectasia (blood vessel growth), and dyspigmentation (discoloration of the skin).  -Recommend to limit long-term use of topical corticosteroids to less than 14 days per month to reduce risk of side effects.

## 2025-08-05 NOTE — PROGRESS NOTES
"Dictation #1  MRN:[id number]  CSN:[id number] Subjective     Victor Manuel Dwyer" is a 59 y.o. male who presents for the following: Suspicious Skin Lesion (New patient: lesions to face, scalp, left foot. Patient notes recent change to lesions. No history of skin cancer. ).     Intake Questions  Are you an organ transplant recipient?: (Patient-Rptd) (P) Yes  Have you had or do you have a Staph Infection?: (Patient-Rptd) (P) No  Have you had or do you have Vacular Disease?: (Patient-Rptd) (P) No  Do you use sunscreen?: (Patient-Rptd) (P) None  Do you use a tanning bed?: (Patient-Rptd) (P) No    Review of Systems:  No other skin or systemic complaints other than what is documented elsewhere in the note.    The following portions of the chart were reviewed this encounter and updated as appropriate:         Skin Cancer History  Biopsy Log Book  No skin cancers from Specimen Tracking.    Additional History      Specialty Problems          Dermatology Problems    Lichen simplex    Nummular eczema    Puncture wound     Past Medical History:  Victor Manuel Dwyer"  has a past medical history of Arthritis, Other specified health status, and Visual impairment (11/30/2023).    Past Surgical History:  Victor Manuel Dwyer"  has a past surgical history that includes Other surgical history (10/14/2019); CT angio head w and wo IV contrast (12/04/2023); Cholecystectomy (?); and Tonsillectomy (?).    Family History:  Patient family history includes Blood clot in his father; Cancer in his brother and father; Diabetes in his mother; Multiple sclerosis in his sister.    Social History:  Victor Manuel Dwyer"  reports that he has quit smoking. His smoking use included cigars. He has quit using smokeless tobacco. He reports that he does not currently use alcohol. He reports that he does not currently use drugs after having used the following drugs: Marijuana.    Allergies:  Patient has no known allergies.    Current Medications / CAM's: "  Current Medications[1]     Objective   Well appearing patient in no apparent distress; mood and affect are within normal limits.    A full examination was performed including scalp, head, eyes, ears, nose, lips, neck, chest, axillae, abdomen, back, buttocks, bilateral upper extremities, bilateral lower extremities, hands, feet, fingers, toes, fingernails, and toenails. All findings within normal limits unless otherwise noted below.    Assessment/Plan   Skin Exam  1. NEOPLASM OF UNCERTAIN BEHAVIOR OF SKIN  Right Malar Cheek    - Lesion biopsy  Type of biopsy: tangential    Informed consent: discussed and consent obtained    Timeout: patient name, date of birth, surgical site, and procedure verified    Procedure prep:  Patient was prepped and draped  Anesthesia: the lesion was anesthetized in a standard fashion    Anesthetic:  1% lidocaine w/ epinephrine 1-100,000 local infiltration  Instrument used: DermaBlade    Hemostasis achieved with: aluminum chloride    Outcome: patient tolerated procedure well    Post-procedure details: sterile dressing applied and wound care instructions given    Dressing type: petrolatum and bandage      - Staff Communication: Dermatology Local Anesthesia: 1 % Lidocaine / Epinephrine - Amount:  Specimen 1 - Dermatopathology- DERM LAB  Differential Diagnosis: BCC vs sebaceous hyperplasia  Check Margins Yes/No?:    Comments:    Dermpath Lab: Routine Histopathology (formalin-fixed tissue)  2. LICHEN SIMPLEX CHRONICUS  Left Ankle - Anterior  Lichenified plaque(s)  -Discussed nature of diagnosis  -Recommend discontinuation of pressure or manipulation of the area  -Topical steroids may help to thin out areas of thickened skin  -Discussed with/information given to the patient on the risks, benefits and alternatives of the usage of topical corticosteroids, including but not limited to: atrophy (thinning of the skin), striae (stretch marks), telangiectasia (blood vessel growth), and dyspigmentation  (discoloration of the skin).  -Recommend to limit long-term use of topical corticosteroids to less than 14 days per month to reduce risk of side effects.    - clobetasol (Temovate) 0.05 % cream - Left Ankle - Anterior - Apply topically 2 times a day for 14 days.  3. FOLLICULITIS  Scalp  Follicular based papules/pustules  -Discussed nature of condition  -Discussed treatment options  -This condition is often worsened by heat exposure, sweat exposure, friction/pressure on the skin. Off-load pressure as possible and wear loose, breathable clothing.  -Recommend:            [1]   Current Outpatient Medications:     cholecalciferol (Vitamin D-3) 125 mcg (5000 UT) capsule, Take 1 capsule (125 mcg) by mouth once daily., Disp: 90 capsule, Rfl: 1    clobetasol (Temovate) 0.05 % cream, Apply topically 2 times a day for 14 days., Disp: 60 g, Rfl: 3    cyanocobalamin, vitamin B-12, (Vitamin B-12) 2,500 mcg tablet, sublingual SL tablet, Place 1 tablet (2,500 mcg) under the tongue once daily., Disp: 90 tablet, Rfl: 3    fluticasone (Flonase) 50 mcg/actuation nasal spray, Administer 2 sprays into each nostril once daily. Shake gently. Before first use, prime pump. After use, clean tip and replace cap., Disp: 16 g, Rfl: 11    levocetirizine (Xyzal) 5 mg tablet, Take 1 tablet (5 mg) by mouth once daily in the evening., Disp: 30 tablet, Rfl: 11    meloxicam (Mobic) 15 mg tablet, Take 1 tablet (15 mg) by mouth once daily., Disp: 90 tablet, Rfl: 0    methylPREDNISolone (Medrol Dospak) 4 mg tablets, Follow schedule on package instructions (Patient not taking: Reported on 8/5/2025), Disp: 1 each, Rfl: 0    methylPREDNISolone (Medrol Dospak) 4 mg tablets, Follow schedule on package instructions, Disp: 21 tablet, Rfl: 0    omeprazole (PriLOSEC) 20 mg DR capsule, Take 1 capsule (20 mg) by mouth once daily. (Patient not taking: Reported on 8/5/2025), Disp: 90 capsule, Rfl: 3    tamsulosin (Flomax) 0.4 mg 24 hr capsule, Take 1 capsule (0.4 mg)  by mouth once daily., Disp: 90 capsule, Rfl: 3

## 2025-08-07 LAB
LABORATORY COMMENT REPORT: NORMAL
PATH REPORT.FINAL DX SPEC: NORMAL
PATH REPORT.GROSS SPEC: NORMAL
PATH REPORT.MICROSCOPIC SPEC OTHER STN: NORMAL
PATH REPORT.RELEVANT HX SPEC: NORMAL
PATH REPORT.TOTAL CANCER: NORMAL

## 2025-09-05 DIAGNOSIS — R97.20 ELEVATED PSA MEASUREMENT: ICD-10-CM

## 2025-09-06 LAB — PSA SERPL-MCNC: 2.66 NG/ML

## 2025-09-08 ENCOUNTER — APPOINTMENT (OUTPATIENT)
Dept: UROLOGY | Facility: CLINIC | Age: 60
End: 2025-09-08
Payer: COMMERCIAL